# Patient Record
Sex: FEMALE | Race: WHITE | NOT HISPANIC OR LATINO | Employment: STUDENT | URBAN - NONMETROPOLITAN AREA
[De-identification: names, ages, dates, MRNs, and addresses within clinical notes are randomized per-mention and may not be internally consistent; named-entity substitution may affect disease eponyms.]

---

## 2017-01-17 ENCOUNTER — OFFICE VISIT (OUTPATIENT)
Dept: URGENT CARE | Facility: PHYSICIAN GROUP | Age: 37
End: 2017-01-17
Payer: MEDICAID

## 2017-01-17 ENCOUNTER — APPOINTMENT (OUTPATIENT)
Dept: RADIOLOGY | Facility: IMAGING CENTER | Age: 37
End: 2017-01-17
Attending: FAMILY MEDICINE
Payer: MEDICAID

## 2017-01-17 VITALS
RESPIRATION RATE: 16 BRPM | BODY MASS INDEX: 36.7 KG/M2 | WEIGHT: 215 LBS | HEIGHT: 64 IN | DIASTOLIC BLOOD PRESSURE: 84 MMHG | TEMPERATURE: 98.1 F | HEART RATE: 88 BPM | OXYGEN SATURATION: 99 % | SYSTOLIC BLOOD PRESSURE: 128 MMHG

## 2017-01-17 DIAGNOSIS — M25.511 ACUTE PAIN OF RIGHT SHOULDER: ICD-10-CM

## 2017-01-17 PROCEDURE — 99214 OFFICE O/P EST MOD 30 MIN: CPT | Performed by: FAMILY MEDICINE

## 2017-01-17 PROCEDURE — 73030 X-RAY EXAM OF SHOULDER: CPT | Mod: RT | Performed by: FAMILY MEDICINE

## 2017-01-17 RX ORDER — ACETAMINOPHEN 325 MG/1
650 TABLET ORAL EVERY 4 HOURS PRN
COMMUNITY
End: 2017-08-02

## 2017-01-17 RX ORDER — PREDNISONE 20 MG/1
TABLET ORAL
Qty: 12 TAB | Refills: 0 | Status: SHIPPED | OUTPATIENT
Start: 2017-01-17 | End: 2017-08-02

## 2017-01-17 NOTE — PROGRESS NOTES
Chief Complaint:    Chief Complaint   Patient presents with   • Shoulder Pain     R shoulder that makes arm go numb X 1 month       History of Present Illness:     present. This is a new problem. Has pain in right shoulder, all over, x 1 month. Just woke up one day with the pain. Thought probably had just slept wrong on it. No injury or trauma. Since then, she will also experience fluctuating numbness in right arm. Sometimes drops objects, possibly due to numbness, not actual weakness. Takes Excedrin migraine for HAs and sometimes helps shoulder.      Review of Systems:    Constitutional: Negative for fever, chills, and diaphoresis.   Eyes: Negative for change in vision, photophobia, pain, redness, and discharge.  ENT: Negative for ear pain, ear discharge, hearing loss, tinnitus, nasal congestion, nosebleeds, and sore throat.    Respiratory: Negative for cough, hemoptysis, sputum production, shortness of breath, wheezing, and stridor.    Cardiovascular: Negative for chest pain, palpitations, orthopnea, claudication, leg swelling, and PND.   Gastrointestinal: Negative for abdominal pain, nausea, vomiting, diarrhea, constipation, blood in stool, and melena.   Genitourinary: Negative for dysuria, urinary urgency, urinary frequency, hematuria, and flank pain.   Musculoskeletal: See HPI.  Skin: Negative for rash and itching.   Neurological: See HPI.  Endo: Negative for polydipsia.   Heme: Does not bruise/bleed easily.   Psychiatric/Behavioral: Negative for depression, suicidal ideas, hallucinations, memory loss and substance abuse. The patient is not nervous/anxious and does not have insomnia.      Past Medical History:    History reviewed. No pertinent past medical history.    Past Surgical History:    History reviewed. No pertinent past surgical history.    Social History:    Social History     Social History   • Marital Status: Single     Spouse Name: N/A   • Number of Children: N/A   • Years of Education: N/A  "    Occupational History   • Not on file.     Social History Main Topics   • Smoking status: Not on file   • Smokeless tobacco: Not on file   • Alcohol Use: Not on file   • Drug Use: Not on file   • Sexual Activity: Not on file     Other Topics Concern   • Not on file     Social History Narrative       Family History:    History reviewed. No pertinent family history.    Medications:    Current Outpatient Prescriptions on File Prior to Visit   Medication Sig Dispense Refill   • fluoxetine (PROZAC) 40 MG capsule Take 40 mg by mouth every day.       No current facility-administered medications on file prior to visit.       Allergies:    Allergies   Allergen Reactions   • Pcn [Penicillins]          Vitals:    Filed Vitals:    01/17/17 1229   BP: 128/84   Pulse: 88   Temp: 36.7 °C (98.1 °F)   Resp: 16   Height: 1.626 m (5' 4\")   Weight: 97.523 kg (215 lb)   SpO2: 99%       Physical Exam:    Constitutional: Vital signs reviewed. Appears well-developed and well-nourished. No acute distress.   Eyes: Sclera white, conjunctivae clear.  ENT: External ears normal. Hearing normal.  Cardiovascular: Peripheral pulses 2+.   Pulmonary/Chest: Respirations non-labored.  Musculoskeletal: Mildly decreased right shoulder internal rotation, otherwise essentially normal right shoulder ROM. Normal gait. No tenderness to palpation. No muscular atrophy or weakness.  Neurological: Alert and oriented to person, place, and time. Muscle tone normal. Coordination normal. Light touch and sensation normal.   Skin: No rashes or lesions. Warm, dry, normal turgor.  Psychiatric: Normal mood and affect. Behavior is normal. Judgment and thought content normal.     Diagnostics:    DX-SHOULDER 2+ (Order #[id number]) on 1/17/17       Narrative        1/17/2017 12:46 PM    HISTORY/REASON FOR EXAM:  Right shoulder pain for one month      TECHNIQUE/EXAM DESCRIPTION AND NUMBER OF VIEWS:  3 views of the RIGHT shoulder.    COMPARISON: None    FINDINGS:  Bone " mineralization is normal.  There is no evidence of acute fracture.  There is no evidence of dislocation.  There is joint surface irregularity of the distal surface of the right clavicle. Marginal spurring is also present.  No abnormalities are identified in the soft tissues.         Impression        1.  There is no evidence of acute fracture.    2.  Irregularity of distal surface of right clavicle is noted. This could be due to normal variation. Differential diagnosis includes inflammatory arthropathy versus degenerative change.     Images and Rad report reviewed with them and copies to them.      Assessment / Plan:    1. Acute pain of right shoulder  - DX-SHOULDER 2+ RIGHT; Future  - predniSONE (DELTASONE) 20 MG Tab; 2 TABS ONCE A DAY ON DAYS 1-4, 1 TAB ONCE A DAY ON DAYS 5-8. TAKE WITH FOOD.  Dispense: 12 Tab; Refill: 0      Discussed with them DDX and management options.    Agreeable to medication prescribed for anti-inflammatory effect.    Follow-up with PCP or urgent care if getting worse or not better with above.

## 2017-01-17 NOTE — MR AVS SNAPSHOT
"        Aliza Garrett   2017 11:50 AM   Office Visit   MRN: [id number]    Department:  [facility]   Dept Phone:  [phone]    Description:  Female : 1980   Provider:  Zan Clark M.D.           Reason for Visit     Shoulder Pain R shoulder that makes arm go numb X 1 month      Allergies as of 2017     Allergen Noted Reactions    Pcn [Penicillins] 2014         You were diagnosed with     Acute pain of right shoulder   [[id number]]         Vital Signs     Blood Pressure Pulse Temperature Respirations Height Weight    128/84 mmHg 88 36.7 °C (98.1 °F) 16 1.626 m (5' 4\") 97.523 kg (215 lb)    Body Mass Index Oxygen Saturation Breastfeeding?             36.89 kg/m2 99% No         Basic Information     Date Of Birth Sex Race Ethnicity Preferred Language    1980 Female White Unknown English      Health Maintenance        Date Due Completion Dates    IMM DTaP/Tdap/Td Vaccine (1 - Tdap) 1998 ---    PAP SMEAR 2000 ---    IMM INFLUENZA (1) 2012            Current Immunizations     Influenza TIV (IM) 2012      Below and/or attached are the medications your provider expects you to take. Review all of your home medications and newly ordered medications with your provider and/or pharmacist. Follow medication instructions as directed by your provider and/or pharmacist. Please keep your medication list with you and share with your provider. Update the information when medications are discontinued, doses are changed, or new medications (including over-the-counter products) are added; and carry medication information at all times in the event of emergency situations     Allergies:  PCN - (reactions not documented)               Medications  Valid as of: 2017 -  2:12 PM    Generic Name Brand Name Tablet Size Instructions for use    Acetaminophen (Tab) TYLENOL 325 MG Take 650 mg by mouth every four hours as needed.        FLUoxetine HCl (Cap) PROZAC " 40 MG Take 40 mg by mouth every day.        PredniSONE (Tab) DELTASONE 20 MG 2 TABS ONCE A DAY ON DAYS 1-4, 1 TAB ONCE A DAY ON DAYS 5-8. TAKE WITH FOOD.        .                 Medicines prescribed today were sent to:     [facility] [place] - [address]    [address] [place] [address]    Phone: [phone] Fax: [phone]    Open 24 Hours?: No      Medication refill instructions:       If your prescription bottle indicates you have medication refills left, it is not necessary to call your provider’s office. Please contact your pharmacy and they will refill your medication.    If your prescription bottle indicates you do not have any refills left, you may request refills at any time through one of the following ways: The online [employer] system (except Urgent Care), by calling your provider’s office, or by asking your pharmacy to contact your provider’s office with a refill request. Medication refills are processed only during regular business hours and may not be available until the next business day. Your provider may request additional information or to have a follow-up visit with you prior to refilling your medication.   *Please Note: Medication refills are assigned a new Rx number when refilled electronically. Your pharmacy may indicate that no refills were authorized even though a new prescription for the same medication is available at the pharmacy. Please request the medicine by name with the pharmacy before contacting your provider for a refill.        Your To Do List     Future Labs/Procedures Complete By Expires    DX-SHOULDER 2+ RIGHT  As directed 1/24/2017         [employer] Access Code: [email or web address]  Expires: 2/16/2017  2:12 PM    Your email address is not on file at [facility].  Email Addresses are required for you to sign up for [employer], please contact [phone] to verify your personal information and to provide your email address prior to attempting to register for  [email or web address].    Aliza Kala Gerry  [address] [place] [address]    [employer]  A secure, online tool to manage your health information     [employer]’s Dlyte.comt® is a secure, online tool that connects you to your personalized health information from the privacy of your home -- day or night - making it very easy for you to manage your healthcare. Once the activation process is completed, you can even access your medical information using the [employer] cooper, which is available for free in the Apple Cooper store or Google Play store.     To learn more about [employer], visit [email or web address]    There are two levels of access available (as shown below):   My Chart Features  [facility] Primary Care Doctor [facility]  Specialists [facility]  Urgent  Care Non-[facility] Primary Care Doctor   Email your healthcare team securely and privately 24/7 X X X    Manage appointments: schedule your next appointment; view details of past/upcoming appointments X      Request prescription refills. X      View recent personal medical records, including lab and immunizations X X X X   View health record, including health history, allergies, medications X X X X   Read reports about your outpatient visits, procedures, consult and ER notes X X X X   See your discharge summary, which is a recap of your hospital and/or ER visit that includes your diagnosis, lab results, and care plan X X  X     How to register for [employer]:  Once your e-mail address has been verified, follow the following steps to sign up for [employer].     1. Go to  [email or web address]  2. Click on the Sign Up Now box, which takes you to the New Member Sign Up page. You will need to provide the following information:  a. Enter your [employer] Access Code exactly as it appears at the top of this page. (You will not need to use this code after you’ve completed the sign-up process. If you do not sign up before the expiration date, you must request a new code.)   b. Enter your date of  birth.   c. Enter your home email address.   d. Click Submit, and follow the next screen’s instructions.  3. Create a [employer] ID. This will be your [employer] login ID and cannot be changed, so think of one that is secure and easy to remember.  4. Create a Luminary [employer]t password. You can change your password at any time.  5. Enter your Password Reset Question and Answer. This can be used at a later time if you forget your password.   6. Enter your e-mail address. This allows you to receive e-mail notifications when new information is available in [employer] E[employer] Project.  7. Click Sign Up. You can now view your health information.    For assistance activating your [employer] account, call [phone]

## 2017-03-10 ENCOUNTER — NON-PROVIDER VISIT (OUTPATIENT)
Dept: URGENT CARE | Facility: PHYSICIAN GROUP | Age: 37
End: 2017-03-10

## 2017-03-10 DIAGNOSIS — Z02.1 PRE-EMPLOYMENT DRUG SCREENING: ICD-10-CM

## 2017-03-10 DIAGNOSIS — Z02.1 PRE-EMPLOYMENT DRUG TESTING: ICD-10-CM

## 2017-03-10 LAB
AMP AMPHETAMINE: NORMAL
COC COCAINE: NORMAL
INT CON NEG: NEGATIVE
INT CON POS: POSITIVE
MET METHAMPHETAMINES: NORMAL
OPI OPIATES: NORMAL
PCP PHENCYCLIDINE: NORMAL
POC DRUG COMMENT 753798-OCCUPATIONAL HEALTH: NORMAL
THC: NORMAL

## 2017-03-10 PROCEDURE — 80305 DRUG TEST PRSMV DIR OPT OBS: CPT | Performed by: FAMILY MEDICINE

## 2017-08-02 ENCOUNTER — OFFICE VISIT (OUTPATIENT)
Dept: URGENT CARE | Facility: PHYSICIAN GROUP | Age: 37
End: 2017-08-02
Payer: MEDICAID

## 2017-08-02 VITALS
DIASTOLIC BLOOD PRESSURE: 90 MMHG | RESPIRATION RATE: 14 BRPM | SYSTOLIC BLOOD PRESSURE: 120 MMHG | OXYGEN SATURATION: 99 % | BODY MASS INDEX: 38.93 KG/M2 | HEIGHT: 64 IN | TEMPERATURE: 98 F | WEIGHT: 228 LBS | HEART RATE: 68 BPM

## 2017-08-02 DIAGNOSIS — D22.5 NEVUS OF BACK: ICD-10-CM

## 2017-08-02 DIAGNOSIS — L98.9 PAINFUL SKIN LESION: ICD-10-CM

## 2017-08-02 PROCEDURE — 99213 OFFICE O/P EST LOW 20 MIN: CPT | Performed by: NURSE PRACTITIONER

## 2017-08-02 RX ORDER — IBUPROFEN 600 MG/1
600 TABLET ORAL EVERY 8 HOURS PRN
Qty: 30 TAB | Refills: 0 | Status: SHIPPED | OUTPATIENT
Start: 2017-08-02

## 2017-08-02 NOTE — PROGRESS NOTES
"Chief Complaint   Patient presents with   • Nevus     on back       HISTORY OF PRESENT ILLNESS: Patient is a 37 y.o. female who presents today due to concerns of a mole to her back. States she has had a mole to her back for years but is concerned as it has increased in size and has become painful over the past month. Also notes a \"black dot\" has appeared in the center of the mole in the last month. Denies drainage, fever, chills, malaise. Has not done anything or taken anything for symptom relief. She does not yet have a PCP.     There are no active problems to display for this patient.      Allergies:Pcn    No current Epic-ordered outpatient prescriptions on file.     No current Epic-ordered facility-administered medications on file.       History reviewed. No pertinent past medical history.    Social History   Substance Use Topics   • Smoking status: Never Smoker    • Smokeless tobacco: Former User     Types: Chew   • Alcohol Use: No       No family status information on file.   History reviewed. No pertinent family history.    ROS:  Review of Systems   Constitutional: Negative for fever, chills, weight loss, malaise, and fatigue.   HENT: Negative for ear pain, nosebleeds, congestion, sore throat and neck pain.    Neuro: Negative for headache, sensory changes, weakness, seizure, LOC.   Cardiovascular: Negative for chest pain, palpitations, orthopnea and leg swelling.   Respiratory: Negative for cough, sputum production, shortness of breath and wheezing.   Gastrointestinal: Negative for abdominal pain, nausea, vomiting or diarrhea.   Genitourinary: Negative for dysuria, urgency and frequency.  Musculoskeletal: Negative for falls, neck pain, back pain, joint pain, myalgias.   Skin: Negative for rash, diaphoresis. Positive for painful mole.     Exam:  Blood pressure 120/90, pulse 68, temperature 36.7 °C (98 °F), resp. rate 14, height 1.626 m (5' 4\"), weight 103.42 kg (228 lb), SpO2 99 %.  General: well-nourished, " well-developed female in NAD  Head: normocephalic, atraumatic  Eyes: PERRLA, no conjunctival injection, acuity grossly intact, lids normal.  Ears: normal shape and symmetry, no tenderness, no discharge. External canals are without any significant edema or erythema. Tympanic membranes are without any inflammation, no effusion. Gross auditory acuity is intact.  Nose: symmetrical without tenderness, no discharge.  Mouth/Throat: reasonable hygiene, no erythema, exudates or tonsillar enlargement.  Neck: no masses, range of motion within normal limits, no tracheal deviation. No obvious thyroid enlargement.   Lymph: no cervical adenopathy. No supraclavicular adenopathy.   Neuro: alert and oriented. Cranial nerves 1-12 grossly intact. No sensory deficit.   Cardiovascular: regular rate and rhythm. No edema.  Pulmonary: no distress. Chest is symmetrical with respiration, no wheezes, crackles, or rhonchi.   Abdomen: soft, non-tender, no guarding, no hepatosplenomegaly.  Musculoskeletal: no clubbing, appropriate muscle tone, gait is stable.  Skin: warm, dry, intact, no clubbing, no cyanosis, no rashes. There is a 1cm raised, circular nevus to her left mid back with a black marking cental of lesion. No associated erythema or swelling.   Psych: appropriate mood, affect, judgement.         Assessment/Plan:  1. Nevus of back  REFERRAL TO DERMATOLOGY   2. Painful skin lesion  ibuprofen (MOTRIN) 600 MG Tab    REFERRAL TO DERMATOLOGY       Refer to derm for further eval and care. Instructed to establish care with a PCP for removal this week. Motrin for pain relief.   Supportive care, differential diagnoses, and indications for immediate follow-up discussed with patient.   Pathogenesis of diagnosis discussed including typical length and natural progression.   Instructed to return to clinic or nearest emergency department for any change in condition, further concerns, or worsening of symptoms.  Patient states understanding of the plan  of care and discharge instructions.          Please note that this dictation was created using voice recognition software. I have made every reasonable attempt to correct obvious errors, but I expect that there are errors of grammar and possibly content that I did not discover before finalizing the note.      CARLITOS Frey.

## 2017-08-02 NOTE — MR AVS SNAPSHOT
"        Aliza Garrett   2017 11:00 AM   Office Visit   MRN: [id number]    Department:  [facility]   Dept Phone:  [phone]    Description:  Female : 1980   Provider:  ERA Garnett           Reason for Visit     Nevus on back      Allergies as of 2017     Allergen Noted Reactions    Pcn [Penicillins] 2014   Hives    Child jay      You were diagnosed with     Nevus of back   [[id number]]       Painful skin lesion   [[id number]]         Vital Signs     Blood Pressure Pulse Temperature Respirations Height Weight    120/90 mmHg 68 36.7 °C (98 °F) 14 1.626 m (5' 4\") 103.42 kg (228 lb)    Body Mass Index Oxygen Saturation Smoking Status             39.12 kg/m2 99% Never Smoker          Basic Information     Date Of Birth Sex Race Ethnicity Preferred Language    1980 Female White Unknown English      Health Maintenance        Date Due Completion Dates    IMM DTaP/Tdap/Td Vaccine (1 - Tdap) 1998 ---    PAP SMEAR 2000 ---    IMM INFLUENZA (1) 2012            Current Immunizations     Influenza TIV (IM) 2012      Below and/or attached are the medications your provider expects you to take. Review all of your home medications and newly ordered medications with your provider and/or pharmacist. Follow medication instructions as directed by your provider and/or pharmacist. Please keep your medication list with you and share with your provider. Update the information when medications are discontinued, doses are changed, or new medications (including over-the-counter products) are added; and carry medication information at all times in the event of emergency situations     Allergies:  PCN - Hives               Medications  Valid as of: 2017 -  2:23 PM    Generic Name Brand Name Tablet Size Instructions for use    Ibuprofen (Tab) MOTRIN 600 MG Take 1 Tab by mouth every 8 hours as needed for Mild Pain, Moderate Pain or Inflammation. Take with " food.        .                 Medicines prescribed today were sent to:     [facility] - [place], [place] - [address]    [address] [place] [address]    Phone: [phone] Fax: [phone]    Open 24 Hours?: No      Medication refill instructions:       If your prescription bottle indicates you have medication refills left, it is not necessary to call your provider’s office. Please contact your pharmacy and they will refill your medication.    If your prescription bottle indicates you do not have any refills left, you may request refills at any time through one of the following ways: The online [employer] system (except Urgent Care), by calling your provider’s office, or by asking your pharmacy to contact your provider’s office with a refill request. Medication refills are processed only during regular business hours and may not be available until the next business day. Your provider may request additional information or to have a follow-up visit with you prior to refilling your medication.   *Please Note: Medication refills are assigned a new Rx number when refilled electronically. Your pharmacy may indicate that no refills were authorized even though a new prescription for the same medication is available at the pharmacy. Please request the medicine by name with the pharmacy before contacting your provider for a refill.        Referral     A referral request has been sent to our patient care coordination department. Please allow 3-5 business days for us to process this request and contact you either by phone or mail. If you do not hear from us by the 5th business day, please call us at [phone].           [employer] Access Code: [email or web address]  Expires: 9/1/2017  2:23 PM    Your email address is not on file at [facility].  Email Addresses are required for you to sign up for [employer], please contact [phone] to verify your personal information and to provide your email address prior to attempting  to register for [employer].    Aliza Garrett  [address] [place] [address], [place] [address]    MyChart  A secure, online tool to manage your health information     [employer]’s M[employer]na [employer]t® is a secure, online tool that connects you to your personalized health information from the privacy of your home -- day or night - making it very easy for you to manage your healthcare. Once the activation process is completed, you can even access your medical information using the [employer] cooper, which is available for free in the Apple Cooper store or Google Play store.     To learn more about [employer], visit [email or web address]    There are two levels of access available (as shown below):   My Chart Features  [facility] Primary Care Doctor [facility]  Specialists [facility]  Urgent  Care Non-[facility] Primary Care Doctor   Email your healthcare team securely and privately 24/7 X X X    Manage appointments: schedule your next appointment; view details of past/upcoming appointments X      Request prescription refills. X      View recent personal medical records, including lab and immunizations X X X X   View health record, including health history, allergies, medications X X X X   Read reports about your outpatient visits, procedures, consult and ER notes X X X X   See your discharge summary, which is a recap of your hospital and/or ER visit that includes your diagnosis, lab results, and care plan X X  X     How to register for [employer]:  Once your e-mail address has been verified, follow the following steps to sign up for [employer].     1. Go to  [email or web address]  2. Click on the Sign Up Now box, which takes you to the New Member Sign Up page. You will need to provide the following information:  a. Enter your [employer] Access Code exactly as it appears at the top of this page. (You will not need to use this code after you’ve completed the sign-up process. If you do not sign up before the expiration date, you must request a new code.)   b. Enter  your date of birth.   c. Enter your home email address.   d. Click Submit, and follow the next screen’s instructions.  3. Create a Thrillophilia.com ID. This will be your [employer]com login ID and cannot be changed, so think of one that is secure and easy to remember.  4. Create a V[employer] password. You can change your password at any time.  5. Enter your Password Reset Question and Answer. This can be used at a later time if you forget your password.   6. Enter your e-mail address. This allows you to receive e-mail notifications when new information is available in [email or web address].  7. Click Sign Up. You can now view your health information.    For assistance activating your [email or web address] account, call [phone]

## 2017-11-02 ENCOUNTER — APPOINTMENT (RX ONLY)
Dept: URBAN - METROPOLITAN AREA CLINIC 31 | Facility: CLINIC | Age: 37
Setting detail: DERMATOLOGY
End: 2017-11-02

## 2017-11-02 DIAGNOSIS — D22 MELANOCYTIC NEVI: ICD-10-CM

## 2017-11-02 PROBLEM — L85.3 XEROSIS CUTIS: Status: ACTIVE | Noted: 2017-11-02

## 2017-11-02 PROBLEM — D22.9 MELANOCYTIC NEVI, UNSPECIFIED: Status: ACTIVE | Noted: 2017-11-02

## 2017-11-02 PROBLEM — L29.8 OTHER PRURITUS: Status: ACTIVE | Noted: 2017-11-02

## 2017-11-02 PROCEDURE — 99202 OFFICE O/P NEW SF 15 MIN: CPT

## 2017-11-02 PROCEDURE — ? COUNSELING

## 2017-11-02 NOTE — PROCEDURE: COUNSELING
Detail Level: Detailed
Patient Specific Counseling (Will Not Stick From Patient To Patient): Discussed biopsy what we are looking to rule out and further treatment

## 2017-12-12 ENCOUNTER — APPOINTMENT (RX ONLY)
Dept: URBAN - METROPOLITAN AREA CLINIC 31 | Facility: CLINIC | Age: 37
Setting detail: DERMATOLOGY
End: 2017-12-12

## 2017-12-12 DIAGNOSIS — D22 MELANOCYTIC NEVI: ICD-10-CM

## 2017-12-12 PROBLEM — D22.5 MELANOCYTIC NEVI OF TRUNK: Status: ACTIVE | Noted: 2017-12-12

## 2017-12-12 PROCEDURE — 11100: CPT

## 2017-12-12 PROCEDURE — ? BIOPSY BY SHAVE METHOD

## 2017-12-12 ASSESSMENT — LOCATION DETAILED DESCRIPTION DERM: LOCATION DETAILED: LEFT INFERIOR UPPER BACK

## 2017-12-12 ASSESSMENT — LOCATION SIMPLE DESCRIPTION DERM: LOCATION SIMPLE: LEFT UPPER BACK

## 2017-12-12 ASSESSMENT — LOCATION ZONE DERM: LOCATION ZONE: TRUNK

## 2017-12-12 NOTE — PROCEDURE: BIOPSY BY SHAVE METHOD
Bill 87695 For Specimen Handling/Conveyance To Laboratory?: no
Consent: Written consent was obtained and risks were reviewed including but not limited to scarring, infection, bleeding, scabbing, incomplete removal, nerve damage and allergy to anesthesia.
Render Post-Care Instructions In Note?: yes
Detail Level: Detailed
Dressing: bandage
Additional Anesthesia Volume In Cc (Will Not Render If 0): 0
Wound Care: Aquaphor
Lab Facility: [id number]
Accession #: [id number]
Silver Nitrate Text: The wound bed was treated with silver nitrate after the biopsy was performed.
Billing Type: Third-Party Bill
Anesthesia Volume In Cc: 0.5
Electrodesiccation And Curettage Text: The wound bed was treated with electrodesiccation and curettage after the biopsy was performed.
Cryotherapy Text: The wound bed was treated with cryotherapy after the biopsy was performed.
Biopsy Type: H and E
Biopsy Method: 15 blade
Post-Care Instructions: I reviewed with the patient in detail post-care instructions. Patient is to keep the biopsy site dry overnight, and then apply bacitracin twice daily until healed. Patient may apply hydrogen peroxide soaks to remove any crusting.
Hemostasis: Electrocautery
Type Of Destruction Used: Silver Nitrate
Notification Instructions: Patient will be notified of biopsy results. However, patient instructed to call the office if not contacted within 2 weeks.
Lab: [id number]
Anesthesia Type: 1% lidocaine with epinephrine

## 2018-01-15 ENCOUNTER — OFFICE VISIT (OUTPATIENT)
Dept: URGENT CARE | Facility: PHYSICIAN GROUP | Age: 38
End: 2018-01-15

## 2018-01-15 DIAGNOSIS — Z02.1 PRE-EMPLOYMENT DRUG SCREENING: ICD-10-CM

## 2018-01-15 LAB
AMP AMPHETAMINE: NORMAL
COC COCAINE: NORMAL
INT CON NEG: NORMAL
INT CON POS: NORMAL
MET METHAMPHETAMINES: NORMAL
OPI OPIATES: NORMAL
PCP PHENCYCLIDINE: NORMAL
POC DRUG COMMENT 753798-OCCUPATIONAL HEALTH: NEGATIVE
THC: NORMAL

## 2018-01-15 PROCEDURE — 80305 DRUG TEST PRSMV DIR OPT OBS: CPT | Performed by: PHYSICIAN ASSISTANT

## 2018-05-09 ENCOUNTER — OFFICE VISIT (OUTPATIENT)
Dept: URGENT CARE | Facility: PHYSICIAN GROUP | Age: 38
End: 2018-05-09
Payer: MEDICAID

## 2018-05-09 VITALS
TEMPERATURE: 97.1 F | HEART RATE: 80 BPM | OXYGEN SATURATION: 99 % | RESPIRATION RATE: 16 BRPM | DIASTOLIC BLOOD PRESSURE: 70 MMHG | WEIGHT: 225 LBS | SYSTOLIC BLOOD PRESSURE: 110 MMHG | BODY MASS INDEX: 38.41 KG/M2 | HEIGHT: 64 IN

## 2018-05-09 DIAGNOSIS — R31.21 ASYMPTOMATIC MICROSCOPIC HEMATURIA: ICD-10-CM

## 2018-05-09 DIAGNOSIS — M54.9 BACK PAIN, UNSPECIFIED BACK LOCATION, UNSPECIFIED BACK PAIN LATERALITY, UNSPECIFIED CHRONICITY: ICD-10-CM

## 2018-05-09 LAB
APPEARANCE UR: CLEAR
BILIRUB UR STRIP-MCNC: NEGATIVE MG/DL
COLOR UR AUTO: YELLOW
GLUCOSE UR STRIP.AUTO-MCNC: NEGATIVE MG/DL
KETONES UR STRIP.AUTO-MCNC: NEGATIVE MG/DL
LEUKOCYTE ESTERASE UR QL STRIP.AUTO: NORMAL
NITRITE UR QL STRIP.AUTO: NEGATIVE
PH UR STRIP.AUTO: 5 [PH] (ref 5–8)
PROT UR QL STRIP: NORMAL MG/DL
RBC UR QL AUTO: NORMAL
SP GR UR STRIP.AUTO: 1.03
UROBILINOGEN UR STRIP-MCNC: NEGATIVE MG/DL

## 2018-05-09 PROCEDURE — 81002 URINALYSIS NONAUTO W/O SCOPE: CPT | Performed by: NURSE PRACTITIONER

## 2018-05-09 PROCEDURE — 99213 OFFICE O/P EST LOW 20 MIN: CPT | Mod: 25 | Performed by: NURSE PRACTITIONER

## 2018-05-09 ASSESSMENT — ENCOUNTER SYMPTOMS
RESPIRATORY NEGATIVE: 1
CONSTITUTIONAL NEGATIVE: 1
GASTROINTESTINAL NEGATIVE: 1
MUSCULOSKELETAL NEGATIVE: 1
NEUROLOGICAL NEGATIVE: 1
CARDIOVASCULAR NEGATIVE: 1

## 2018-05-09 NOTE — LETTER
May 9, 2018    To Whom It May Concern:         This is confirmation that Aliza Danielleee Gerry attended her scheduled appointment with ERA Davis on 5/09/18. Patient may return to work tomorrow May 10, 2018.         If you have any questions please do not hesitate to call me at the phone number listed below.    Sincerely,          CARLITOS Davis.  [phone]

## 2018-05-10 NOTE — PROGRESS NOTES
"Subjective:      Aliza Garrett is a 37 y.o. female who presents with Fall (Back Pain)            HPI   Pt states that last night when she was getting out of bed to go to the bathroom she missed a step and tweaked her back, mild discomfort last night that has resolved  Patient is denying any falls.  Patient is denying hitting anything  Patient states that this are steep  Patient is denying any type of neck pain or flank pain  Patient is denying any numbness tingling saddle anesthesia or bowel incontinence  Patient is denying any hematuria or vaginal bleeding--> incidental blood found on urinalysis done by MA  Patient states she's had a hysterectomy  Patient states she's had a history of kidney stones but states she never knew she was passing a stone  Patient states she just needs a note for work tomorrow    PMH:  has no past medical history on file.  MEDS:   Current Outpatient Prescriptions:   •  ibuprofen (MOTRIN) 600 MG Tab, Take 1 Tab by mouth every 8 hours as needed for Mild Pain, Moderate Pain or Inflammation. Take with food., Disp: 30 Tab, Rfl: 0  ALLERGIES:   Allergies   Allergen Reactions   • Pcn [Penicillins] Hives     Child jay     SURGHX:   Past Surgical History:   Procedure Laterality Date   • ABDOMINAL HYSTERECTOMY TOTAL       SOCHX:  reports that she has never smoked. She has quit using smokeless tobacco. Her smokeless tobacco use included Chew. She reports that she does not drink alcohol or use drugs.  FH: family history is not on file.    Review of Systems   Constitutional: Negative.    Respiratory: Negative.    Cardiovascular: Negative.    Gastrointestinal: Negative.    Genitourinary: Negative.    Musculoskeletal: Negative.    Skin: Negative.    Neurological: Negative.    Endo/Heme/Allergies: Negative.      Steady gait     Objective:     /70   Pulse 80   Temp 36.2 °C (97.1 °F)   Resp 16   Ht 1.626 m (5' 4\")   Wt 102.1 kg (225 lb)   SpO2 99%   BMI 38.62 kg/m²      Physical Exam "   Constitutional: She is oriented to person, place, and time. She appears well-developed and well-nourished.   HENT:   Head: Normocephalic and atraumatic.   Eyes: Conjunctivae are normal.   Neck: Normal range of motion. Neck supple.   Cardiovascular: Normal rate, regular rhythm and normal heart sounds.    Pulmonary/Chest: Effort normal and breath sounds normal.   Abdominal: There is no CVA tenderness.   Musculoskeletal: Normal range of motion.   Normal range of motion to lumbar spine.  Steady gait  2+ pedal pulses  No spinal or paraspinal tenderness  No ecchymosis noted or buttocks     Neurological: She is alert and oriented to person, place, and time.   Skin: Skin is warm and dry. Capillary refill takes less than 2 seconds.   Psychiatric: She has a normal mood and affect. Her behavior is normal. Judgment and thought content normal.               Assessment/Plan:     1. Back pain, unspecified back location, unspecified back pain laterality, unspecified chronicity    - POCT Urinalysis--> large blood  -Patient currently has no back pain  -Work note given as requested  -Patient to monitor for back pain or discomfort and follow up  -Severe back pain with saddle anesthesia bowel incontinence numbness or weakness patient go to ER    2. Asymptomatic microscopic hematuria  -Patient is asymptomatic and incidental finding  -Will send for culture and if positive will treat for UTI  -If culture is negative for bacteria patient will need to follow up with PCP retest her urine or hematuria and may need further workup  -Discussed with patient in detail  -Patient to monitor for any abnormal bleeding and follow-up sooner

## 2023-09-19 ENCOUNTER — HOSPITAL ENCOUNTER (OUTPATIENT)
Dept: LAB | Facility: MEDICAL CENTER | Age: 43
End: 2023-09-19
Attending: FAMILY MEDICINE
Payer: MEDICAID

## 2023-09-19 ENCOUNTER — OFFICE VISIT (OUTPATIENT)
Dept: URGENT CARE | Facility: PHYSICIAN GROUP | Age: 43
End: 2023-09-19
Payer: MEDICAID

## 2023-09-19 VITALS
BODY MASS INDEX: 43.87 KG/M2 | TEMPERATURE: 97.5 F | WEIGHT: 273 LBS | RESPIRATION RATE: 16 BRPM | HEART RATE: 78 BPM | SYSTOLIC BLOOD PRESSURE: 126 MMHG | HEIGHT: 66 IN | OXYGEN SATURATION: 98 % | DIASTOLIC BLOOD PRESSURE: 86 MMHG

## 2023-09-19 DIAGNOSIS — R50.9 FEVER, UNKNOWN ORIGIN: ICD-10-CM

## 2023-09-19 LAB
BASOPHILS # BLD AUTO: 0.3 % (ref 0–1.8)
BASOPHILS # BLD: 0.03 K/UL (ref 0–0.12)
EOSINOPHIL # BLD AUTO: 0.05 K/UL (ref 0–0.51)
EOSINOPHIL NFR BLD: 0.5 % (ref 0–6.9)
ERYTHROCYTE [DISTWIDTH] IN BLOOD BY AUTOMATED COUNT: 44.4 FL (ref 35.9–50)
FLUAV RNA SPEC QL NAA+PROBE: NEGATIVE
FLUBV RNA SPEC QL NAA+PROBE: NEGATIVE
HCT VFR BLD AUTO: 44.9 % (ref 37–47)
HGB BLD-MCNC: 14.8 G/DL (ref 12–16)
IMM GRANULOCYTES # BLD AUTO: 0.09 K/UL (ref 0–0.11)
IMM GRANULOCYTES NFR BLD AUTO: 0.9 % (ref 0–0.9)
LYMPHOCYTES # BLD AUTO: 2.59 K/UL (ref 1–4.8)
LYMPHOCYTES NFR BLD: 26.2 % (ref 22–41)
MCH RBC QN AUTO: 30.5 PG (ref 27–33)
MCHC RBC AUTO-ENTMCNC: 33 G/DL (ref 32.2–35.5)
MCV RBC AUTO: 92.6 FL (ref 81.4–97.8)
MONOCYTES # BLD AUTO: 0.55 K/UL (ref 0–0.85)
MONOCYTES NFR BLD AUTO: 5.6 % (ref 0–13.4)
NEUTROPHILS # BLD AUTO: 6.57 K/UL (ref 1.82–7.42)
NEUTROPHILS NFR BLD: 66.5 % (ref 44–72)
NRBC # BLD AUTO: 0 K/UL
NRBC BLD-RTO: 0 /100 WBC (ref 0–0.2)
PLATELET # BLD AUTO: 356 K/UL (ref 164–446)
PMV BLD AUTO: 10.6 FL (ref 9–12.9)
RBC # BLD AUTO: 4.85 M/UL (ref 4.2–5.4)
RSV RNA SPEC QL NAA+PROBE: NEGATIVE
SARS-COV-2 RNA RESP QL NAA+PROBE: NEGATIVE
WBC # BLD AUTO: 9.9 K/UL (ref 4.8–10.8)

## 2023-09-19 PROCEDURE — 87637 SARSCOV2&INF A&B&RSV AMP PRB: CPT | Mod: QW | Performed by: FAMILY MEDICINE

## 2023-09-19 PROCEDURE — 36415 COLL VENOUS BLD VENIPUNCTURE: CPT

## 2023-09-19 PROCEDURE — 3079F DIAST BP 80-89 MM HG: CPT | Performed by: FAMILY MEDICINE

## 2023-09-19 PROCEDURE — 99203 OFFICE O/P NEW LOW 30 MIN: CPT | Performed by: FAMILY MEDICINE

## 2023-09-19 PROCEDURE — 85025 COMPLETE CBC W/AUTO DIFF WBC: CPT

## 2023-09-19 PROCEDURE — 3074F SYST BP LT 130 MM HG: CPT | Performed by: FAMILY MEDICINE

## 2023-09-19 RX ORDER — SODIUM CHLORIDE, SODIUM LACTATE, POTASSIUM CHLORIDE, CALCIUM CHLORIDE 600; 310; 30; 20 MG/100ML; MG/100ML; MG/100ML; MG/100ML
INJECTION, SOLUTION INTRAVENOUS
COMMUNITY
Start: 2023-06-23

## 2023-09-19 NOTE — LETTER
September 19, 2023         Patient: Aliza Garrett   YOB: 1980   Date of Visit: 9/19/2023           To Whom it May Concern:    Aliza Garrett was seen in my clinic on 9/19/2023. She may return to work on Friday.   Please excuse her recent work absence.    If you have any questions or concerns, please don't hesitate to call.        Sincerely,           Gage Maria M.D.  Electronically Signed

## 2023-09-19 NOTE — RESULT ENCOUNTER NOTE
Please call pt:      COVID, influenza screen negative.       Follow up in one week if no improvement, sooner if symptoms worsen.

## 2023-09-19 NOTE — PROGRESS NOTES
"Chief Complaint   Patient presents with    Fever     Since last Thursday   No other sx. 101 temp    Headache     Yesterday pos caused by fever.          fever  This is a new problem. The current episode started last night ago. The problem has been waxing and waning.  Maximum temperature was 101.    Associated symptoms include fatigue, headache.      She denies dysuria, cough, sore throat or nasal congestion Pertinent negatives include no  Neck pain, nausea, vomiting, diarrhea,   or wheezing. Nothing aggravates the symptoms.  Patient has tried Tylenol for the symptoms which reduced the fever.                       Review of Systems   Constitutional: positive for fatigue  HENT: negative for ear pain   Cardiovascular - denies chest pain or dyspnea  Respiratory: .  Negative for wheezing.    Neurological: +  for headaches.   GI - denies nausea, vomiting or diarrhea  Neuro - denies numbness or tingling.            Objective:     /86   Pulse 78   Temp 36.4 °C (97.5 °F) (Temporal)   Resp 16   Ht 1.676 m (5' 6\")   Wt 124 kg (273 lb)   SpO2 98%         Physical Exam   Constitutional: patient is oriented to person, place, and time. Patient appears well-developed and well-nourished. No distress.   HENT:   Head: Normocephalic and atraumatic.   Right Ear: External ear normal.   Left Ear: External ear normal.   Nose:  No mucosal edema.  Right sinus exhibits no maxillary sinus tenderness. Left sinus exhibits no maxillary sinus tenderness.   Mouth/Throat: Mucous membranes are normal. No oral lesions. No posterior erythema. No oropharyngeal exudate or posterior oropharyngeal edema.   Eyes: Conjunctivae and EOM are normal. Pupils are equal, round, and reactive to light. Right eye exhibits no discharge. Left eye exhibits no discharge. No scleral icterus.   Neck: Normal range of motion. Neck supple. No tracheal deviation present.   Cardiovascular: Normal rate, regular rhythm and normal heart sounds.  Exam reveals no friction " rub.    Pulmonary/Chest: Effort normal. No respiratory distress. Patient has no wheezes or rhonchi. Patient has no rales.    Musculoskeletal:  exhibits no edema.   Lymphadenopathy:  none  Neurological: patient is alert and oriented to person, place, and time.   Skin: Skin is warm and dry. No rash noted. No erythema.   Psychiatric: patient  has a normal mood and affect.  behavior is normal.   Nursing note and vitals reviewed.              Assessment/Plan:     1. Fever, unknown origin    COVID , influenza negative    She has no other sx to explain the fever      Will check CBC      Differential diagnosis, natural history, supportive care, and indications for immediate follow-up discussed. All questions answered. Patient agrees with the plan of care.     Follow-up as needed if symptoms worsen or fail to improve to PCP, Urgent care or Emergency Room.     I have personally reviewed prior external notes and test results pertinent to today's visit.  I have independently reviewed and interpreted all diagnostics ordered during this urgent care acute visit.

## 2023-10-09 ENCOUNTER — OFFICE VISIT (OUTPATIENT)
Dept: URGENT CARE | Facility: PHYSICIAN GROUP | Age: 43
End: 2023-10-09
Payer: MEDICAID

## 2023-10-09 VITALS
SYSTOLIC BLOOD PRESSURE: 102 MMHG | OXYGEN SATURATION: 97 % | WEIGHT: 273 LBS | RESPIRATION RATE: 16 BRPM | DIASTOLIC BLOOD PRESSURE: 84 MMHG | HEIGHT: 66 IN | TEMPERATURE: 97.7 F | BODY MASS INDEX: 43.87 KG/M2 | HEART RATE: 76 BPM

## 2023-10-09 DIAGNOSIS — J06.9 VIRAL URI WITH COUGH: Primary | ICD-10-CM

## 2023-10-09 LAB
FLUAV RNA SPEC QL NAA+PROBE: NEGATIVE
FLUBV RNA SPEC QL NAA+PROBE: NEGATIVE
RSV RNA SPEC QL NAA+PROBE: NEGATIVE
SARS-COV-2 RNA RESP QL NAA+PROBE: NEGATIVE

## 2023-10-09 PROCEDURE — 3074F SYST BP LT 130 MM HG: CPT | Performed by: PHYSICIAN ASSISTANT

## 2023-10-09 PROCEDURE — 99213 OFFICE O/P EST LOW 20 MIN: CPT | Performed by: PHYSICIAN ASSISTANT

## 2023-10-09 PROCEDURE — 3079F DIAST BP 80-89 MM HG: CPT | Performed by: PHYSICIAN ASSISTANT

## 2023-10-09 PROCEDURE — 87637 SARSCOV2&INF A&B&RSV AMP PRB: CPT | Mod: QW | Performed by: PHYSICIAN ASSISTANT

## 2023-10-09 RX ORDER — BENZONATATE 100 MG/1
100 CAPSULE ORAL 3 TIMES DAILY PRN
Qty: 21 CAPSULE | Refills: 0 | Status: SHIPPED | OUTPATIENT
Start: 2023-10-09

## 2023-10-09 NOTE — PROGRESS NOTES
"Subjective:   Aliza Garrett is a 43 y.o. female who presents for Cough (X 2 days with fever about 2 weeks off and on, wheezing, S.O.B..  Negative Covid and strep 2 weeks ago. )      HPI  The patient presents to the Urgent Care with complaints of a cough and runny nose onset 2 days ago.  Reports of a unspecified fever 2 weeks ago and had negative viral testing and normal CBC.  Developed symptoms 2 days ago.  Intermittent fever.  101F Tmax.  Associated fatigue, nonproductive cough.  Mild shortness of breath while walking. Denies any body aches, vomiting, diarrhea. No known history of COVID. COVID vaccinated. Nonsmoker.             History reviewed. No pertinent past medical history.  Allergies   Allergen Reactions    Marijuana (Cannabis Sativa) Anaphylaxis    Pcn [Penicillins] Hives     Child jay        Objective:     /84   Pulse 76   Temp 36.5 °C (97.7 °F) (Temporal)   Resp 16   Ht 1.676 m (5' 6\")   Wt 124 kg (273 lb)   SpO2 97%   BMI 44.06 kg/m²     Physical Exam  Vitals reviewed.   Constitutional:       General: She is not in acute distress.     Appearance: Normal appearance. She is not ill-appearing or toxic-appearing.   HENT:      Mouth/Throat:      Mouth: Mucous membranes are moist.      Pharynx: Oropharynx is clear. Uvula midline. Posterior oropharyngeal erythema present. No pharyngeal swelling, oropharyngeal exudate or uvula swelling.   Eyes:      Conjunctiva/sclera: Conjunctivae normal.      Pupils: Pupils are equal, round, and reactive to light.   Cardiovascular:      Rate and Rhythm: Normal rate and regular rhythm.      Heart sounds: Normal heart sounds.   Pulmonary:      Effort: Pulmonary effort is normal. No respiratory distress.      Breath sounds: Normal breath sounds. No wheezing, rhonchi or rales.   Musculoskeletal:      Cervical back: Neck supple. No rigidity.   Lymphadenopathy:      Cervical: No cervical adenopathy.   Skin:     General: Skin is warm and dry.   Neurological:     "  General: No focal deficit present.      Mental Status: She is alert and oriented to person, place, and time.   Psychiatric:         Mood and Affect: Mood normal.         Behavior: Behavior normal.       Results for orders placed or performed in visit on 10/09/23   POCT CoV-2, Flu A/B, RSV by PCR   Result Value Ref Range    SARS-CoV-2 by PCR Negative Negative, Invalid    Influenza virus A RNA Negative Negative, Invalid    Influenza virus B, PCR Negative Negative, Invalid    RSV, PCR Negative Negative, Invalid       Diagnosis and associated orders:     1. Viral URI with cough  - benzonatate (TESSALON) 100 MG Cap; Take 1 Capsule by mouth 3 times a day as needed for Cough.  Dispense: 21 Capsule; Refill: 0  - POCT CoV-2, Flu A/B, RSV by PCR       Comments/MDM:     The patient's presenting symptoms and exam findings are consistent with a upper respiratory infection most likely viral etiology. They have a normal pulse oximetry on room air, afebrile, and a normal pulmonary exam. Overall, the patient is very well appearing. I do not feel that this patient would benefit from antibiotics at this time.   Recommended symptomatic and supportive care at this time that includes plenty of fluids, rest, Tylenol/Ibuprofen for pain/fever, warm salt water gargles for sore throat, OTC cough and decongestant medication, Flonase, nasal saline washes. If no improvement in 5-7 days or any worsening symptoms, recommend returning to the urgent care for re-evaluation.        I personally reviewed prior external notes and test results pertinent to today's visit. Pathogenesis of diagnosis discussed including typical length and natural progression. Supportive care, natural history, differential diagnoses, and indications for immediate follow-up discussed. Patient expresses understanding and agrees to plan. Patient denies any other questions or concerns.     Follow-up with the primary care physician for recheck, reevaluation, and consideration of  further management.    Please note that this dictation was created using voice recognition software. I have made a reasonable attempt to correct obvious errors, but I expect that there are errors of grammar and possibly content that I did not discover before finalizing the note.    This note was electronically signed by Nick Camara PA-C

## 2024-09-26 ENCOUNTER — OFFICE VISIT (OUTPATIENT)
Dept: URGENT CARE | Facility: CLINIC | Age: 44
End: 2024-09-26
Payer: MEDICAID

## 2024-09-26 VITALS
BODY MASS INDEX: 41.49 KG/M2 | DIASTOLIC BLOOD PRESSURE: 84 MMHG | RESPIRATION RATE: 18 BRPM | TEMPERATURE: 97.9 F | HEART RATE: 94 BPM | WEIGHT: 258.16 LBS | SYSTOLIC BLOOD PRESSURE: 122 MMHG | HEIGHT: 66 IN | OXYGEN SATURATION: 94 %

## 2024-09-26 DIAGNOSIS — J06.9 VIRAL URI: ICD-10-CM

## 2024-09-26 DIAGNOSIS — R09.81 SINUS CONGESTION: ICD-10-CM

## 2024-09-26 PROCEDURE — 3079F DIAST BP 80-89 MM HG: CPT | Performed by: PHYSICIAN ASSISTANT

## 2024-09-26 PROCEDURE — 99213 OFFICE O/P EST LOW 20 MIN: CPT | Performed by: PHYSICIAN ASSISTANT

## 2024-09-26 PROCEDURE — 3074F SYST BP LT 130 MM HG: CPT | Performed by: PHYSICIAN ASSISTANT

## 2024-09-26 RX ORDER — FLUTICASONE PROPIONATE 50 MCG
1 SPRAY, SUSPENSION (ML) NASAL 2 TIMES DAILY
Qty: 16 G | Refills: 0 | Status: SHIPPED | OUTPATIENT
Start: 2024-09-26

## 2024-09-26 ASSESSMENT — ENCOUNTER SYMPTOMS
MYALGIAS: 0
COUGH: 1
SHORTNESS OF BREATH: 0
FEVER: 1
VOMITING: 0
HEADACHES: 1
SORE THROAT: 1
DIARRHEA: 0
EYE REDNESS: 0
SINUS PAIN: 1
EYE DISCHARGE: 0
NAUSEA: 0

## 2024-09-26 NOTE — PROGRESS NOTES
Subjective     Aliza Garrett is a 44 y.o. female who presents with Cough (Congestion, Fever, cough x 3 days. )            URI   This is a new problem. Episode onset: x 3 days ago. The problem has been unchanged. The maximum temperature recorded prior to her arrival was 101 - 101.9 F. Associated symptoms include congestion, coughing, headaches, sinus pain, sneezing and a sore throat. Pertinent negatives include no chest pain, diarrhea, nausea or vomiting.     PMH:  has no past medical history on file.  MEDS:   Current Outpatient Medications:     benzonatate (TESSALON) 100 MG Cap, Take 1 Capsule by mouth 3 times a day as needed for Cough. (Patient not taking: Reported on 9/26/2024), Disp: 21 Capsule, Rfl: 0    Lactated Ringers (LR) Solution, , Disp: , Rfl:     ibuprofen (MOTRIN) 600 MG Tab, Take 1 Tab by mouth every 8 hours as needed for Mild Pain, Moderate Pain or Inflammation. Take with food. (Patient not taking: Reported on 9/26/2024), Disp: 30 Tab, Rfl: 0  ALLERGIES:   Allergies   Allergen Reactions    Marijuana (Cannabis Sativa) Anaphylaxis    Pcn [Penicillins] Hives     Child jay     SURGHX:   Past Surgical History:   Procedure Laterality Date    ABDOMINAL HYSTERECTOMY TOTAL       SOCHX:  reports that she has never smoked. She has quit using smokeless tobacco.  Her smokeless tobacco use included chew. She reports that she does not drink alcohol and does not use drugs.  FH: Family history was reviewed, no pertinent findings to report      Review of Systems   Constitutional:  Positive for fever.   HENT:  Positive for congestion, sinus pain, sneezing and sore throat.    Eyes:  Negative for discharge and redness.   Respiratory:  Positive for cough. Negative for shortness of breath.    Cardiovascular:  Negative for chest pain.   Gastrointestinal:  Negative for diarrhea, nausea and vomiting.   Musculoskeletal:  Negative for myalgias.   Neurological:  Positive for headaches.              Objective     BP  "122/84   Pulse 94   Temp 36.6 °C (97.9 °F) (Temporal)   Resp 18   Ht 1.676 m (5' 6\")   Wt 117 kg (258 lb 2.5 oz)   SpO2 94%   BMI 41.67 kg/m²      Physical Exam  Constitutional:       General: She is not in acute distress.     Appearance: Normal appearance. She is not ill-appearing.   HENT:      Head: Normocephalic and atraumatic.      Right Ear: Tympanic membrane, ear canal and external ear normal.      Left Ear: Tympanic membrane, ear canal and external ear normal.      Nose: Nose normal.      Mouth/Throat:      Mouth: Mucous membranes are moist.      Pharynx: Oropharynx is clear. No posterior oropharyngeal erythema.   Eyes:      Extraocular Movements: Extraocular movements intact.      Conjunctiva/sclera: Conjunctivae normal.   Cardiovascular:      Rate and Rhythm: Normal rate and regular rhythm.      Heart sounds: Normal heart sounds.   Pulmonary:      Effort: Pulmonary effort is normal. No respiratory distress.      Breath sounds: Normal breath sounds. No wheezing.   Musculoskeletal:         General: Normal range of motion.      Cervical back: Normal range of motion and neck supple.   Skin:     General: Skin is warm and dry.   Neurological:      Mental Status: She is alert and oriented to person, place, and time.                  Progress:  The patient declined viral testing today in clinic.            Assessment & Plan        Assessment & Plan  Viral URI         Sinus congestion    Orders:    fluticasone (FLONASE) 50 MCG/ACT nasal spray; Administer 1 Spray into affected nostril(S) 2 times a day.            The patient's presenting symptoms and physical exam findings are consistent with a viral URI with associated sinus congestion.  The patient's physical exam today in clinic was normal.  The patient is nontoxic and appears in no acute distress.  The patient's vital signs are stable and within normal limits.  She is afebrile today in clinic.  Discussed likely viral etiology with the patient.  The patient " declined viral testing at this time.  Will prescribe the patient Flonase for her current symptoms.  Advised the patient to monitor for worsening signs and or symptoms.  Recommend OTC medications and supportive care for symptomatic management.  Recommend patient follow-up with primary care as needed.  Discussed return precautions with the patient, and she verbalized understanding.    Differential diagnoses, supportive care, and indications for immediate follow-up discussed with patient.   Instructed to return to clinic or nearest emergency department for any change in condition, further concerns, or worsening of symptoms.    OTC Tylenol or Motrin for fever/discomfort.  OTC cough/cold medication for symptomatic relief  OTC Supportive Care for Congestion - saline nasal spray or neti pot  Drink plenty of fluids  Follow-up with PCP  Return to clinic or go to the ED if symptoms worsen or fail to improve, or if the patient should develop worsening/increasing cough, congestion, ear pain, sore throat, shortness of breath, wheezing, chest pain, fever/chills, and/or any concerning symptoms.    Discussed plan with the patient, and she agrees to the above.     I personally reviewed prior external notes and test results pertinent to today's visit.  I have independently reviewed and interpreted all diagnostics ordered during this urgent care visit.     Please note that this dictation was created using voice recognition software. I have made every reasonable attempt to correct obvious errors, but I expect that there may be errors of grammar and possibly content that I did not discover before finalizing the note.       This note was electronically signed by Shayla Jaquez PA-C

## 2024-12-04 ENCOUNTER — OFFICE VISIT (OUTPATIENT)
Dept: MEDICAL GROUP | Facility: CLINIC | Age: 44
End: 2024-12-04
Payer: MEDICAID

## 2024-12-04 ENCOUNTER — RESEARCH ENCOUNTER (OUTPATIENT)
Dept: RESEARCH | Facility: MEDICAL CENTER | Age: 44
End: 2024-12-04

## 2024-12-04 VITALS
HEART RATE: 77 BPM | BODY MASS INDEX: 41.1 KG/M2 | OXYGEN SATURATION: 97 % | SYSTOLIC BLOOD PRESSURE: 122 MMHG | RESPIRATION RATE: 18 BRPM | TEMPERATURE: 97.4 F | HEIGHT: 66 IN | WEIGHT: 255.73 LBS | DIASTOLIC BLOOD PRESSURE: 84 MMHG

## 2024-12-04 DIAGNOSIS — Z13.79 GENETIC SCREENING: ICD-10-CM

## 2024-12-04 DIAGNOSIS — R63.5 WEIGHT GAIN: ICD-10-CM

## 2024-12-04 DIAGNOSIS — F41.9 ANXIETY: ICD-10-CM

## 2024-12-04 DIAGNOSIS — R10.2 PELVIC PAIN: ICD-10-CM

## 2024-12-04 DIAGNOSIS — N63.0 MASS OF BREAST, UNSPECIFIED LATERALITY: ICD-10-CM

## 2024-12-04 DIAGNOSIS — G25.0 BENIGN ESSENTIAL TREMOR: ICD-10-CM

## 2024-12-04 DIAGNOSIS — N39.46 MIXED STRESS AND URGE INCONTINENCE: ICD-10-CM

## 2024-12-04 DIAGNOSIS — Z00.6 RESEARCH STUDY PATIENT: ICD-10-CM

## 2024-12-04 DIAGNOSIS — R53.83 OTHER FATIGUE: ICD-10-CM

## 2024-12-04 PROBLEM — G43.909 MIGRAINE: Status: ACTIVE | Noted: 2024-12-04

## 2024-12-04 PROBLEM — F31.9 BIPOLAR 1 DISORDER (HCC): Status: ACTIVE | Noted: 2024-12-04

## 2024-12-04 PROBLEM — N39.3 STRESS INCONTINENCE OF URINE: Status: ACTIVE | Noted: 2024-12-04

## 2024-12-04 PROBLEM — K21.9 GASTROESOPHAGEAL REFLUX DISEASE: Status: ACTIVE | Noted: 2024-12-04

## 2024-12-04 PROBLEM — E66.01 MORBID OBESITY (HCC): Status: ACTIVE | Noted: 2024-12-04

## 2024-12-04 PROCEDURE — 3074F SYST BP LT 130 MM HG: CPT | Performed by: PHYSICIAN ASSISTANT

## 2024-12-04 PROCEDURE — 99214 OFFICE O/P EST MOD 30 MIN: CPT | Performed by: PHYSICIAN ASSISTANT

## 2024-12-04 PROCEDURE — 3079F DIAST BP 80-89 MM HG: CPT | Performed by: PHYSICIAN ASSISTANT

## 2024-12-04 RX ORDER — PROPRANOLOL HYDROCHLORIDE 60 MG/1
60 CAPSULE, EXTENDED RELEASE ORAL DAILY
Qty: 90 CAPSULE | Refills: 1 | Status: SHIPPED | OUTPATIENT
Start: 2024-12-04

## 2024-12-04 ASSESSMENT — ANXIETY QUESTIONNAIRES
2. NOT BEING ABLE TO STOP OR CONTROL WORRYING: NEARLY EVERY DAY
3. WORRYING TOO MUCH ABOUT DIFFERENT THINGS: NEARLY EVERY DAY
7. FEELING AFRAID AS IF SOMETHING AWFUL MIGHT HAPPEN: NOT AT ALL
6. BECOMING EASILY ANNOYED OR IRRITABLE: NEARLY EVERY DAY
1. FEELING NERVOUS, ANXIOUS, OR ON EDGE: NEARLY EVERY DAY
IF YOU CHECKED OFF ANY PROBLEMS ON THIS QUESTIONNAIRE, HOW DIFFICULT HAVE THESE PROBLEMS MADE IT FOR YOU TO DO YOUR WORK, TAKE CARE OF THINGS AT HOME, OR GET ALONG WITH OTHER PEOPLE: VERY DIFFICULT
GAD7 TOTAL SCORE: 18
4. TROUBLE RELAXING: NEARLY EVERY DAY
5. BEING SO RESTLESS THAT IT IS HARD TO SIT STILL: NEARLY EVERY DAY

## 2024-12-04 ASSESSMENT — PATIENT HEALTH QUESTIONNAIRE - PHQ9: CLINICAL INTERPRETATION OF PHQ2 SCORE: 0

## 2024-12-04 NOTE — LETTER
[employer] [facility]  Vicky Ramos P.A.-C.  [address]  [facility] [address]  Fax: [phone]   Authorization for Release/Disclosure of   Protected Health Information   Name: ALIZA NICOLE : 1980 SSN: [id number]   Address: [address] [place] [address] Phone:    [phone] (home)    I authorize the entity listed below to release/disclose the PHI below to:   [facility]/Vicky Raoms P.A.-C. and Vicky Ramos P.A.-C.   Provider or Entity Name:  [facility]      Address   City, State, Zip   Phone:      Fax:     Reason for request: continuity of care   Information to be released:    [  ] LAST COLONOSCOPY,  including any PATH REPORT and follow-up  [  ] LAST FIT/COLOGUARD RESULT [  ] LAST DEXA  [  ] LAST MAMMOGRAM  [  ] LAST PAP  [  ] LAST LABS [  ] RETINA EXAM REPORT  [  ] IMMUNIZATION RECORDS  [ x ] Release all info      [  ] Check here and initial the line next to each item to release ALL health information INCLUDING  _____ Care and treatment for drug and / or alcohol abuse  _____ HIV testing, infection status, or AIDS  _____ Genetic Testing    DATES OF SERVICE OR TIME PERIOD TO BE DISCLOSED: _____________  I understand and acknowledge that:  * This Authorization may be revoked at any time by you in writing, except if your health information has already been used or disclosed.  * Your health information that will be used or disclosed as a result of you signing this authorization could be re-disclosed by the recipient. If this occurs, your re-disclosed health information may no longer be protected by State or Federal laws.  * You may refuse to sign this Authorization. Your refusal will not affect your ability to obtain treatment.  * This Authorization becomes effective upon signing and will  on (date) __________.      If no date is indicated, this Authorization will  one (1) year from the signature date.    Name: Aliza Nicole  Signature: Date:   2024      PLEASE FAX REQUESTED RECORDS BACK TO: [phone]

## 2024-12-04 NOTE — RESEARCH NOTE
Patient has signed the consent form. The applicable research collection order(s) have been created.    Patient is ready for scheduling.

## 2024-12-04 NOTE — RESEARCH NOTE
Patient has been referred by Vicky Ramos P.A.-C. The initial referral follow-up message, which includes the consent form link, has been sent to the patient.    Eligible Studies: HNP

## 2024-12-04 NOTE — LETTER
[facility]  Vicky Ramos P.A.-C.  [address]  [facility] [address]  Fax: [phone]   Authorization for Release/Disclosure of   Protected Health Information   Name: ALIZA NICOLE : 1980 SSN: [id number]   Address: [address] [place] [address] Phone:    [phone] (home)    I authorize the entity listed below to release/disclose the PHI below to:   Count includes the [facility]/Vicky Ramos P.A.-C. and Vicky Ramos P.A.-C.   Provider or Entity Name: [facility]      Address   City, State, Zip   Phone:      Fax:     Reason for request: continuity of care   Information to be released:    [  ] LAST COLONOSCOPY,  including any PATH REPORT and follow-up  [  ] LAST FIT/COLOGUARD RESULT [  ] LAST DEXA  [  ] LAST MAMMOGRAM  [  ] LAST PAP  [  ] LAST LABS [  ] RETINA EXAM REPORT  [  ] IMMUNIZATION RECORDS  [ xx ] Release all info      [  ] Check here and initial the line next to each item to release ALL health information INCLUDING  _____ Care and treatment for drug and / or alcohol abuse  _____ HIV testing, infection status, or AIDS  _____ Genetic Testing    DATES OF SERVICE OR TIME PERIOD TO BE DISCLOSED: ____2019-current _________  I understand and acknowledge that:  * This Authorization may be revoked at any time by you in writing, except if your health information has already been used or disclosed.  * Your health information that will be used or disclosed as a result of you signing this authorization could be re-disclosed by the recipient. If this occurs, your re-disclosed health information may no longer be protected by State or Federal laws.  * You may refuse to sign this Authorization. Your refusal will not affect your ability to obtain treatment.  * This Authorization becomes effective upon signing and will  on (date) __________.      If no date is indicated, this Authorization will  one (1) year from the signature date.    Name: Aliza Nicole  Signature: Date:    12/4/2024     PLEASE FAX REQUESTED RECORDS BACK TO: [phone]

## 2024-12-04 NOTE — PROGRESS NOTES
cc:  anxiety    Subjective:     Aliza Garrett is a 44 y.o. female presenting for anxiety        History of Present Illness  The patient presents for evaluation of multiple medical concerns.    She underwent a partial hysterectomy in , during which only her uterus was removed. She is currently experiencing pelvic pain, similar to the discomfort she felt when she had ovarian cysts in the past. The pain, described as a constant dull ache, has been present for about 2 weeks. She has a high tolerance for pain and does not experience any discomfort during urination. She reports no presence of blood in her urine or stool. Her last colonoscopy was performed at [facility].    She has a breast mass that was discovered during a mammogram. Initially, she had follow-up mammograms every 6 months, but this was later changed to annually as the mass was not growing. Her last mammogram was over a year ago.    She has been experiencing anxiety, which she believes is due to the recent loss of her father to cancer. She has previously been on fluoxetine and paroxetine but discontinued these medications due to suicidal ideation. She has not had her thyroid checked recently. She also reports frequent urination, particularly at night, even though she drinks a lot of water. She occasionally experiences urinary incontinence when sneezing or coughing. Her gynecologist has confirmed that her bladder has not prolapsed.    She has a tremor for which she was previously prescribed a beta blocker. She occasionally experiences a rapid heart rate, usually when she is anxious or overheated. She also reports an increase in blood pressure during these episodes. She is interested in resuming medication for her tremor, as it causes her discomfort. The severity of the tremor varies from day to day. She has a device at home to monitor her blood pressure.    She had knee surgery on her ACL.    FAMILY HISTORY  Her father  of cancer. She has a  "family history of heart problems and strokes.       Review of systems:  See above.   Denies any symptoms unless previously indicated.        Current Outpatient Medications:     propranolol LA (INDERAL LA) 60 MG CAPSULE SR 24 HR, Take 1 Capsule by mouth every day., Disp: 90 Capsule, Rfl: 1    fluticasone (FLONASE) 50 MCG/ACT nasal spray, Administer 1 Spray into affected nostril(S) 2 times a day., Disp: 16 g, Rfl: 0    benzonatate (TESSALON) 100 MG Cap, Take 1 Capsule by mouth 3 times a day as needed for Cough. (Patient not taking: Reported on 12/4/2024), Disp: 21 Capsule, Rfl: 0    Lactated Ringers (LR) Solution, , Disp: , Rfl:     ibuprofen (MOTRIN) 600 MG Tab, Take 1 Tab by mouth every 8 hours as needed for Mild Pain, Moderate Pain or Inflammation. Take with food. (Patient not taking: Reported on 12/4/2024), Disp: 30 Tab, Rfl: 0    Allergies, past medical history, past surgical history, family history, social history reviewed and updated    Objective:     Vitals: /84 (BP Location: Left arm, Patient Position: Sitting, BP Cuff Size: Large adult)   Pulse 77   Temp 36.3 °C (97.4 °F) (Temporal)   Resp 18   Ht 1.676 m (5' 6\")   Wt 116 kg (255 lb 11.7 oz)   SpO2 97%   BMI 41.28 kg/m²   General: Alert, pleasant, NAD  EYES:   PERRL, EOMI, no icterus or pallor.  Conjunctivae and lids normal.   HENT:  Normocephalic.  External ears normal.  Neck supple.    Respiratory: Normal respiratory effort.   Abdomen:  obese  Skin: Warm, dry, no rashes.  Musculoskeletal: Gait is normal.  Moves all extremities well.    Extremities: tremor.   Neurological: No tremors, sensation grossly intact,  CN2-12 intact.  Psych:  Affect/mood is normal, judgement is good, memory is intact, grooming is appropriate.      Assessment/Plan:     Aliza was seen today for establish care.    Diagnoses and all orders for this visit:    Pelvic pain  -     Referral to Psychiatry  -     US-PELVIC COMPLETE (TRANSABDOMINAL/TRANSVAGINAL) (COMBO); " Future  -     URINE CULTURE(NEW)  -     URINALYSIS,CULTURE IF INDICATED; Future    Mass of breast, unspecified laterality    Anxiety    Benign essential tremor  -     propranolol LA (INDERAL LA) 60 MG CAPSULE SR 24 HR; Take 1 Capsule by mouth every day.    Other fatigue  -     CBC WITH DIFFERENTIAL; Future  -     TSH WITH REFLEX TO FT4; Future  -     VITAMIN D,25 HYDROXY (DEFICIENCY); Future  -     Comp Metabolic Panel; Future  -     Lipid Profile; Future    Weight gain  -     CBC WITH DIFFERENTIAL; Future  -     TSH WITH REFLEX TO FT4; Future  -     VITAMIN D,25 HYDROXY (DEFICIENCY); Future  -     Comp Metabolic Panel; Future  -     Lipid Profile; Future    Mixed stress and urge incontinence    Genetic screening  -     Referral to Genetic Research Studies    Other orders  -     Cancel: INFLUENZA VACCINE TRI INJ (PF)         Assessment & Plan  1. Pelvic pain.  Given her history of partial hysterectomy, the ovaries may have atrophied. However, the presence of a cyst cannot be ruled out. A pelvic ultrasound will be ordered to further investigate the cause of her pain. She reports a constant dull pain in the area where her ovaries are located, which has been present for about 2 weeks. The ultrasound will help determine if the ovaries are still present and if there is any cyst.    2. Breast mass.  A records release form will be provided for her to sign, allowing us to obtain her previous medical records. A mammogram will be ordered once these records are received. She has been having yearly mammograms, with the last one being a little over a year ago.    3. Anxiety.  A referral to psychiatry will be made for further management of her anxiety. She has a history of suicidal ideation with fluoxetine and paroxetine, making it necessary to involve a psychiatrist for medication management. General labs, including complete blood count, thyroid, vitamin D, liver and kidney function, and cholesterol, will be ordered. A urine  test will also be conducted to rule out any underlying conditions contributing to her symptoms.    4. Tremor.  Propranolol will be initiated at a low dose of 60 mg, with a plan to gradually increase the dose to 120 mg. This medication may also alleviate some of her anxiety symptoms. She is advised to monitor her blood pressure to ensure it does not drop below 100/70. She reports that the tremor is significant enough to warrant medication.    5.  Fatigue/weight gain  Lab work has been ordered to evaluate further.  Follow-up in approximately 6 weeks with test results.    6.  Mixed stress and urge incontinence  We will obtain a urine to evaluate further.  Patient will also have a pelvic ultrasound.  If testing is negative can consider medication.  Can also consider referral to urology.    Health Maintenance.  General labs, including complete blood count, thyroid, vitamin D, liver and kidney function, and cholesterol, will be ordered. A urine test will also be conducted.    Follow-up  Return in 6 weeks for follow-up.    Return in about 6 weeks (around 1/15/2025), or if symptoms worsen or fail to improve.    Please note that this dictation was created using voice recognition software. I have made every reasonable attempt to correct obvious errors, but expect that there are errors of grammar and possible content that I did not discover before finalizing note.

## 2024-12-05 ENCOUNTER — HOSPITAL ENCOUNTER (OUTPATIENT)
Dept: LAB | Facility: MEDICAL CENTER | Age: 44
End: 2024-12-05
Attending: FAMILY MEDICINE

## 2024-12-05 ENCOUNTER — HOSPITAL ENCOUNTER (OUTPATIENT)
Dept: LAB | Facility: MEDICAL CENTER | Age: 44
End: 2024-12-05
Attending: PHYSICIAN ASSISTANT
Payer: MEDICAID

## 2024-12-05 DIAGNOSIS — R63.5 WEIGHT GAIN: ICD-10-CM

## 2024-12-05 DIAGNOSIS — R53.83 OTHER FATIGUE: ICD-10-CM

## 2024-12-05 DIAGNOSIS — R10.2 PELVIC PAIN: ICD-10-CM

## 2024-12-05 DIAGNOSIS — Z00.6 RESEARCH STUDY PATIENT: ICD-10-CM

## 2024-12-05 LAB
25(OH)D3 SERPL-MCNC: 14 NG/ML (ref 30–100)
ALBUMIN SERPL BCP-MCNC: 4.4 G/DL (ref 3.2–4.9)
ALBUMIN/GLOB SERPL: 1.3 G/DL
ALP SERPL-CCNC: 87 U/L (ref 30–99)
ALT SERPL-CCNC: 14 U/L (ref 2–50)
ANION GAP SERPL CALC-SCNC: 8 MMOL/L (ref 7–16)
APPEARANCE UR: CLEAR
AST SERPL-CCNC: 14 U/L (ref 12–45)
BACTERIA #/AREA URNS HPF: ABNORMAL /HPF
BASOPHILS # BLD AUTO: 0.7 % (ref 0–1.8)
BASOPHILS # BLD: 0.08 K/UL (ref 0–0.12)
BILIRUB SERPL-MCNC: 0.3 MG/DL (ref 0.1–1.5)
BILIRUB UR QL STRIP.AUTO: NEGATIVE
BUN SERPL-MCNC: 11 MG/DL (ref 8–22)
CALCIUM ALBUM COR SERPL-MCNC: 8.9 MG/DL (ref 8.5–10.5)
CALCIUM SERPL-MCNC: 9.2 MG/DL (ref 8.5–10.5)
CASTS URNS QL MICRO: ABNORMAL /LPF (ref 0–2)
CHLORIDE SERPL-SCNC: 101 MMOL/L (ref 96–112)
CHOLEST SERPL-MCNC: 215 MG/DL (ref 100–199)
CO2 SERPL-SCNC: 25 MMOL/L (ref 20–33)
COLOR UR: YELLOW
CREAT SERPL-MCNC: 0.64 MG/DL (ref 0.5–1.4)
EOSINOPHIL # BLD AUTO: 0.16 K/UL (ref 0–0.51)
EOSINOPHIL NFR BLD: 1.4 % (ref 0–6.9)
EPITHELIAL CELLS 1715: ABNORMAL /HPF (ref 0–5)
ERYTHROCYTE [DISTWIDTH] IN BLOOD BY AUTOMATED COUNT: 43.9 FL (ref 35.9–50)
FASTING STATUS PATIENT QL REPORTED: NORMAL
GFR SERPLBLD CREATININE-BSD FMLA CKD-EPI: 111 ML/MIN/1.73 M 2
GLOBULIN SER CALC-MCNC: 3.4 G/DL (ref 1.9–3.5)
GLUCOSE SERPL-MCNC: 89 MG/DL (ref 65–99)
GLUCOSE UR STRIP.AUTO-MCNC: NEGATIVE MG/DL
HCT VFR BLD AUTO: 44 % (ref 37–47)
HDLC SERPL-MCNC: 43 MG/DL
HGB BLD-MCNC: 14.5 G/DL (ref 12–16)
IMM GRANULOCYTES # BLD AUTO: 0.18 K/UL (ref 0–0.11)
IMM GRANULOCYTES NFR BLD AUTO: 1.5 % (ref 0–0.9)
KETONES UR STRIP.AUTO-MCNC: NEGATIVE MG/DL
LDLC SERPL CALC-MCNC: 134 MG/DL
LEUKOCYTE ESTERASE UR QL STRIP.AUTO: ABNORMAL
LYMPHOCYTES # BLD AUTO: 3.34 K/UL (ref 1–4.8)
LYMPHOCYTES NFR BLD: 28.7 % (ref 22–41)
MCH RBC QN AUTO: 31 PG (ref 27–33)
MCHC RBC AUTO-ENTMCNC: 33 G/DL (ref 32.2–35.5)
MCV RBC AUTO: 94 FL (ref 81.4–97.8)
MICRO URNS: ABNORMAL
MONOCYTES # BLD AUTO: 0.74 K/UL (ref 0–0.85)
MONOCYTES NFR BLD AUTO: 6.4 % (ref 0–13.4)
NEUTROPHILS # BLD AUTO: 7.14 K/UL (ref 1.82–7.42)
NEUTROPHILS NFR BLD: 61.3 % (ref 44–72)
NITRITE UR QL STRIP.AUTO: NEGATIVE
NRBC # BLD AUTO: 0 K/UL
NRBC BLD-RTO: 0 /100 WBC (ref 0–0.2)
PH UR STRIP.AUTO: 6.5 [PH] (ref 5–8)
PLATELET # BLD AUTO: 339 K/UL (ref 164–446)
PMV BLD AUTO: 10.8 FL (ref 9–12.9)
POTASSIUM SERPL-SCNC: 4 MMOL/L (ref 3.6–5.5)
PROT SERPL-MCNC: 7.8 G/DL (ref 6–8.2)
PROT UR QL STRIP: NEGATIVE MG/DL
RBC # BLD AUTO: 4.68 M/UL (ref 4.2–5.4)
RBC # URNS HPF: ABNORMAL /HPF (ref 0–2)
RBC UR QL AUTO: NEGATIVE
SODIUM SERPL-SCNC: 134 MMOL/L (ref 135–145)
SP GR UR STRIP.AUTO: 1.01
TRIGL SERPL-MCNC: 189 MG/DL (ref 0–149)
TSH SERPL DL<=0.005 MIU/L-ACNC: 1.45 UIU/ML (ref 0.38–5.33)
UROBILINOGEN UR STRIP.AUTO-MCNC: 0.2 EU/DL
WBC # BLD AUTO: 11.6 K/UL (ref 4.8–10.8)
WBC #/AREA URNS HPF: ABNORMAL /HPF

## 2024-12-05 PROCEDURE — 36415 COLL VENOUS BLD VENIPUNCTURE: CPT

## 2024-12-05 PROCEDURE — 84443 ASSAY THYROID STIM HORMONE: CPT

## 2024-12-05 PROCEDURE — 80061 LIPID PANEL: CPT

## 2024-12-05 PROCEDURE — 80053 COMPREHEN METABOLIC PANEL: CPT

## 2024-12-05 PROCEDURE — 85025 COMPLETE CBC W/AUTO DIFF WBC: CPT

## 2024-12-05 PROCEDURE — 82306 VITAMIN D 25 HYDROXY: CPT

## 2024-12-05 PROCEDURE — 81001 URINALYSIS AUTO W/SCOPE: CPT

## 2024-12-09 ENCOUNTER — PATIENT MESSAGE (OUTPATIENT)
Dept: MEDICAL GROUP | Facility: CLINIC | Age: 44
End: 2024-12-09
Payer: MEDICAID

## 2024-12-09 DIAGNOSIS — R10.2 PELVIC PAIN: ICD-10-CM

## 2024-12-25 LAB
APOB+LDLR+PCSK9 GENE MUT ANL BLD/T: NOT DETECTED
BRCA1+BRCA2 DEL+DUP + FULL MUT ANL BLD/T: NOT DETECTED
MLH1+MSH2+MSH6+PMS2 GN DEL+DUP+FUL M: NOT DETECTED

## 2025-01-16 ENCOUNTER — HOSPITAL ENCOUNTER (OUTPATIENT)
Dept: RADIOLOGY | Facility: MEDICAL CENTER | Age: 45
End: 2025-01-16
Attending: PHYSICIAN ASSISTANT
Payer: MEDICAID

## 2025-01-16 DIAGNOSIS — R10.2 PELVIC PAIN: ICD-10-CM

## 2025-01-16 PROCEDURE — 76830 TRANSVAGINAL US NON-OB: CPT

## 2025-01-22 ENCOUNTER — OFFICE VISIT (OUTPATIENT)
Dept: MEDICAL GROUP | Facility: CLINIC | Age: 45
End: 2025-01-22
Payer: MEDICAID

## 2025-01-22 VITALS
HEIGHT: 66 IN | SYSTOLIC BLOOD PRESSURE: 118 MMHG | RESPIRATION RATE: 18 BRPM | WEIGHT: 271.83 LBS | BODY MASS INDEX: 43.69 KG/M2 | TEMPERATURE: 97.4 F | OXYGEN SATURATION: 96 % | HEART RATE: 56 BPM | DIASTOLIC BLOOD PRESSURE: 70 MMHG

## 2025-01-22 DIAGNOSIS — Z12.31 ENCOUNTER FOR SCREENING MAMMOGRAM FOR BREAST CANCER: ICD-10-CM

## 2025-01-22 DIAGNOSIS — Z23 NEED FOR VACCINATION: ICD-10-CM

## 2025-01-22 DIAGNOSIS — D72.829 LEUKOCYTOSIS, UNSPECIFIED TYPE: ICD-10-CM

## 2025-01-22 DIAGNOSIS — N83.202 BILATERAL OVARIAN CYSTS: ICD-10-CM

## 2025-01-22 DIAGNOSIS — N83.201 BILATERAL OVARIAN CYSTS: ICD-10-CM

## 2025-01-22 DIAGNOSIS — E55.9 VITAMIN D DEFICIENCY: ICD-10-CM

## 2025-01-22 DIAGNOSIS — E87.1 HYPONATREMIA: ICD-10-CM

## 2025-01-22 DIAGNOSIS — E66.01 MORBID OBESITY WITH BMI OF 40.0-44.9, ADULT (HCC): ICD-10-CM

## 2025-01-22 DIAGNOSIS — E78.5 HYPERLIPIDEMIA, UNSPECIFIED HYPERLIPIDEMIA TYPE: ICD-10-CM

## 2025-01-22 PROCEDURE — 90715 TDAP VACCINE 7 YRS/> IM: CPT

## 2025-01-22 PROCEDURE — 90471 IMMUNIZATION ADMIN: CPT

## 2025-01-22 PROCEDURE — 3074F SYST BP LT 130 MM HG: CPT | Performed by: PHYSICIAN ASSISTANT

## 2025-01-22 PROCEDURE — 99214 OFFICE O/P EST MOD 30 MIN: CPT | Mod: 25 | Performed by: PHYSICIAN ASSISTANT

## 2025-01-22 PROCEDURE — 90746 HEPB VACCINE 3 DOSE ADULT IM: CPT

## 2025-01-22 PROCEDURE — 3078F DIAST BP <80 MM HG: CPT | Performed by: PHYSICIAN ASSISTANT

## 2025-01-22 PROCEDURE — 90472 IMMUNIZATION ADMIN EACH ADD: CPT

## 2025-01-22 RX ORDER — HYDROXYZINE HYDROCHLORIDE 25 MG/1
25 TABLET, FILM COATED ORAL DAILY
COMMUNITY

## 2025-01-22 RX ORDER — ESCITALOPRAM OXALATE 5 MG/5ML
SOLUTION ORAL
COMMUNITY
Start: 2024-12-24 | End: 2025-01-22

## 2025-01-22 ASSESSMENT — FIBROSIS 4 INDEX: FIB4 SCORE: 0.5

## 2025-01-22 NOTE — LETTER
[facility]  Vicky Ramos P.A.-C.  [address]  [facility] [address]  Fax: [phone]   Authorization for Release/Disclosure of   Protected Health Information   Name: ALIZA NICOLE : 1980 SSN: [id number]   Address: [address] [place] [id number] Phone:    [phone] (home)    I authorize the entity listed below to release/disclose the PHI below to:   [facility]/Vicky Ramos P.A.-C. and Vicky Ramos P.A.-C.   Provider or Entity Name:  [facility]     Address   City, State, Zip   Phone:      Fax:     Reason for request: continuity of care   Information to be released:    [ x ] LAST COLONOSCOPY,  including any PATH REPORT and follow-up  [  ] LAST FIT/COLOGUARD RESULT [  ] LAST DEXA  [  ] LAST MAMMOGRAM  [  ] LAST PAP  [  ] LAST LABS [  ] RETINA EXAM REPORT  [  ] IMMUNIZATION RECORDS  [  ] Release all info      [  ] Check here and initial the line next to each item to release ALL health information INCLUDING  _____ Care and treatment for drug and / or alcohol abuse  _____ HIV testing, infection status, or AIDS  _____ Genetic Testing    DATES OF SERVICE OR TIME PERIOD TO BE DISCLOSED: _____________  I understand and acknowledge that:  * This Authorization may be revoked at any time by you in writing, except if your health information has already been used or disclosed.  * Your health information that will be used or disclosed as a result of you signing this authorization could be re-disclosed by the recipient. If this occurs, your re-disclosed health information may no longer be protected by State or Federal laws.  * You may refuse to sign this Authorization. Your refusal will not affect your ability to obtain treatment.  * This Authorization becomes effective upon signing and will  on (date) __________.      If no date is indicated, this Authorization will  one (1) year from the signature date.    Name: Aliza Nicole  Signature: Date:   2025     PLEASE  FAX REQUESTED RECORDS BACK TO: [phone]

## 2025-01-22 NOTE — PROGRESS NOTES
cc:  ovarian cyst    Subjective:     Aliza Garrett is a 44 y.o. female presenting for ovarian cyst        History of Present Illness  The patient is a 44-year-old female who presents to the office today to follow up with pelvic pain. She also had labs drawn before coming in to be seen. Patient is also due for her Tdap and first Hep B vaccine.    She reports experiencing pain, which she attributes to the presence of cysts. She expresses a preference for not disregarding this issue and is open to a referral to gynecology for further evaluation. She recalls an unusual uterine condition necessitating a  for childbirth. The uterus was described as having a lip, which led to its removal due to associated pain. She speculates on a potential link between these past events and her current symptoms. She does not remember if her hormone levels were ever checked or any lab work was done.    She has been struggling with weight loss and admits to occasional lapses in her diet due to her love for food. She makes a conscious effort to avoid adding salt to her meals. She recalls an incident where she consumed plain pork rinds, which she found excessively salty, leading to significant ankle swelling by the end of the day.    She is currently not on any cholesterol medication. She has a family history of heart disease, with both paternal grandparents and her maternal grandmother having suffered from it. She is uncertain about the cause of death of her maternal grandfather but suspects it may have been heart-related.    She has discontinued the use of Lexapro and is currently only taking propranolol, along with two new medications prescribed by her psychiatrist.    She has requested a mammogram as she has not undergone her annual screening yet.    FAMILY HISTORY  Both her paternal grandparents and her maternal grandmother had a history of heart disease. She is unsure about her maternal grandfather's cause of death but  "believes it might be heart-related.    MEDICATIONS  Current: propranolol  Past: escitalopram    IMMUNIZATIONS  She is due for her Tdap and first Hep B vaccine.       Review of systems:  See above.   Denies any symptoms unless previously indicated.        Current Outpatient Medications:     hydrOXYzine HCl (ATARAX) 25 MG Tab, Take 25 mg by mouth every day., Disp: , Rfl:     propranolol LA (INDERAL LA) 60 MG CAPSULE SR 24 HR, Take 1 Capsule by mouth every day., Disp: 90 Capsule, Rfl: 1    Allergies, past medical history, past surgical history, family history, social history reviewed and updated    Objective:     Vitals: /70 (BP Location: Left arm, Patient Position: Sitting, BP Cuff Size: Large adult)   Pulse (!) 56   Temp 36.3 °C (97.4 °F) (Temporal)   Resp 18   Ht 1.676 m (5' 6\")   Wt 123 kg (271 lb 13.2 oz)   SpO2 96%   BMI 43.87 kg/m²   General: Alert, pleasant, NAD  EYES:   PERRL, EOMI, no icterus or pallor.  Conjunctivae and lids normal.   HENT:  Normocephalic.  External ears normal.   Neck supple.    Respiratory: Normal respiratory effort.    Abdomen: obese  Skin: Warm, dry, no rashes.  Musculoskeletal: Gait is normal.  Moves all extremities well.    Extremities: normal range of motion all extremities.   Neurological: No tremors, sensation grossly intact, CN2-12 intact.  Psych:  Affect/mood is normal, judgement is good, memory is intact, grooming is appropriate.    Results  Laboratory Studies  Vitamin D is low. Thyroid is normal. Cholesterol is elevated with total cholesterol at 215, triglycerides at 189, and HDL at 43. White blood cell count is elevated. Red blood cells are normal. Differentials are normal. Urine test showed a little bit of leukocyte esterase. Sodium was down by one point. Kidney function is normal. Liver function is normal. Fasting sugar was 89.    Imaging  Pelvic ultrasound on 01/16/2025 showed two right ovarian cysts measuring 2.4 cm and 4.0 cm, a 1.7 cm right paraovarian simple " cyst and a 2.6 cm left ovarian cyst.   US-PELVIC COMPLETE (TRANSABDOMINAL/TRANSVAGINAL) (COMBO)  Order: [id number]   Status: Final result       Visible to patient: Yes (seen)       Next appt: None       Dx: Pelvic pain    0 Result Notes  Details    Reading Physician Reading Date Result Priority   Rich De La Cruz M.D.  [phone] 1/16/2025      Narrative & Impression     1/16/2025 9:56 AM     HISTORY/REASON FOR EXAM:  Pain; pelvic pain with hysterectomy        TECHNIQUE/EXAM DESCRIPTION:  Transabdominal and transvaginal pelvic ultrasound.     COMPARISON:   None     FINDINGS:  Both transabdominal and transvaginal scanning were performed to optimally visualize the pelvis.     UTERUS:  The uterus is surgically absent     OVARIES:  The right ovary measures 5.88 cm x 3.88 cm x 3.69 cm. Duplex Doppler examination of the right ovary shows normal waveforms.     There are 2 contiguous mildly complicated right ovarian cysts. Both contain thin internal septations.  4.0 x 3.4 x 3.4 cm and the smaller 2.0 x 2.0 x 2.4 cm.     There is a 1.7 x 1.2 x 1.3 cm simple cyst within the right adnexa.     The left ovary measures 2.61 cm x 1.82 cm x 2.25 cm. Duplex Doppler examination of the left ovary shows normal waveforms.     There is a 2.6 x 1.8 x 2.3 cm simple cyst left ovary.     There is no free fluid seen.     IMPRESSION:     1.  2 mildly complicated right ovarian cysts measuring 4.0 and 2.4 cm.     2.  1.7 cm right paraovarian simple cyst.     3.  2.6 cm cyst left ovary.      Latest Reference Range & Units 12/05/24 11:00   WBC 4.8 - 10.8 K/uL 11.6 (H)   RBC 4.20 - 5.40 M/uL 4.68   Hemoglobin 12.0 - 16.0 g/dL 14.5   Hematocrit 37.0 - 47.0 % 44.0   MCV 81.4 - 97.8 fL 94.0   MCH 27.0 - 33.0 pg 31.0   MCHC 32.2 - 35.5 g/dL 33.0   RDW 35.9 - 50.0 fL 43.9   Platelet Count 164 - 446 K/uL 339   MPV 9.0 - 12.9 fL 10.8   Neutrophils-Polys 44.00 - 72.00 % 61.30   Neutrophils (Absolute) 1.82 - 7.42 K/uL 7.14   Lymphocytes 22.00 - 41.00 % 28.70    Lymphs (Absolute) 1.00 - 4.80 K/uL 3.34   Monocytes 0.00 - 13.40 % 6.40   Monos (Absolute) 0.00 - 0.85 K/uL 0.74   Eosinophils 0.00 - 6.90 % 1.40   Eos (Absolute) 0.00 - 0.51 K/uL 0.16   Basophils 0.00 - 1.80 % 0.70   Baso (Absolute) 0.00 - 0.12 K/uL 0.08   Immature Granulocytes 0.00 - 0.90 % 1.50 (H)   Immature Granulocytes (abs) 0.00 - 0.11 K/uL 0.18 (H)   Nucleated RBC 0.00 - 0.20 /100 WBC 0.00   NRBC (Absolute) K/uL 0.00   Sodium 135 - 145 mmol/L 134 (L)   Potassium 3.6 - 5.5 mmol/L 4.0   Chloride 96 - 112 mmol/L 101   Co2 20 - 33 mmol/L 25   Anion Gap 7.0 - 16.0  8.0   Glucose 65 - 99 mg/dL 89   Bun 8 - 22 mg/dL 11   Creatinine 0.50 - 1.40 mg/dL 0.64   GFR (CKD-EPI) >60 mL/min/1.73 m 2 111   Calcium 8.5 - 10.5 mg/dL 9.2   Correct Calcium 8.5 - 10.5 mg/dL 8.9   AST(SGOT) 12 - 45 U/L 14   ALT(SGPT) 2 - 50 U/L 14   Alkaline Phosphatase 30 - 99 U/L 87   Total Bilirubin 0.1 - 1.5 mg/dL 0.3   Albumin 3.2 - 4.9 g/dL 4.4   Total Protein 6.0 - 8.2 g/dL 7.8   Globulin 1.9 - 3.5 g/dL 3.4   A-G Ratio g/dL 1.3   Fasting Status  Fasting   Cholesterol,Tot 100 - 199 mg/dL 215 (H)   Triglycerides 0 - 149 mg/dL 189 (H)   HDL >=40 mg/dL 43   LDL <100 mg/dL 134 (H)   Urobilinogen, Urine <=1.0 EU/dL 0.2   Color  Yellow   Character  Clear   Specific Gravity <1.035  1.010   Ph 5.0 - 8.0  6.5   Glucose Negative mg/dL Negative   Ketones Negative mg/dL Negative   Bilirubin Negative  Negative   Occult Blood Negative  Negative   Protein Negative mg/dL Negative   Nitrite Negative  Negative   Leukocyte Esterase Negative  Small !   Micro Urine Req  Microscopic   WBC /hpf 3-5   RBC 0 - 2 /hpf 0-2   Epithelial Cells 0 - 5 /hpf 6-10 !   Bacteria None /hpf Few !   Urine Casts 0 - 2 /lpf 0-2   25-Hydroxy   Vitamin D 25 30 - 100 ng/mL 14 (L)   TSH 0.380 - 5.330 uIU/mL 1.450   (H): Data is abnormally high  (L): Data is abnormally low  !: Data is abnormal  Assessment/Plan:     Aliza was seen today for results.    Diagnoses and all orders for  this visit:    Bilateral ovarian cysts  -     Referral to Gynecology  -     US-PELVIC COMPLETE (TRANSABDOMINAL/TRANSVAGINAL) (COMBO); Future    Hyperlipidemia, unspecified hyperlipidemia type  -     Lipid Profile; Future  -     Comp Metabolic Panel; Future    Hyponatremia  -     Comp Metabolic Panel; Future    Leukocytosis, unspecified type  -     CBC WITH DIFFERENTIAL; Future    Encounter for screening mammogram for breast cancer  -     MA-SCREENING MAMMO BILAT W/TOMOSYNTHESIS W/CAD; Future    Vitamin D deficiency    Need for vaccination  -     Tdap Vaccine =>6YO IM  -     Hepatitis B Vaccine Adult 20+        Assessment & Plan  1. Bilateral ovarian cysts.  A pelvic ultrasound on 01/16/2025 showed two right ovarian cysts measuring 2.4 cm and 4.0 cm, a 1.7 cm right paraovarian simple cyst, and a 2.6 cm left ovarian cyst. A referral to gynecology will be initiated for further evaluation. A repeat pelvic ultrasound is scheduled in 6 weeks to monitor the cysts.    2. Hyperlipidemia.  Her cholesterol levels are elevated, with a total cholesterol of 215 mg/dL, triglycerides at 189 mg/dL, and LDL above the desired range. She is advised to improve her diet and exercise regimen over the next 6 months. Labs will be repeated in 6 months to assess progress. If there is no improvement, starting cholesterol medication will be considered.    3. Obesity.  Her BMI is 43.87. Dietary modifications and increased physical activity were discussed. She is encouraged to work on these lifestyle changes and will follow up in 6 months to assess weight changes.    4. Hyponatremia.  Her sodium levels were slightly low. Repeat labs will be conducted in 6 months to monitor sodium levels.    5. Leukocytosis.  Her white blood cell count is elevated. Repeat labs will be conducted in 6 months to monitor white blood cell count.    6. Vitamin D deficiency.  Her vitamin D levels are low. She will start a vitamin D supplement. Labs will be repeated in 6  months to assess vitamin D levels.    7. Health maintenance.  A mammogram has been ordered as she is due for her yearly screening. The first hepatitis B and Tdap vaccines were administered today.    Follow-up  The patient will follow up in 6 months with labs or sooner if needed.    PROCEDURE  The patient underwent a hysterectomy in the past due to an unusual uterine condition.    Return in about 6 months (around 7/22/2025).    Please note that this dictation was created using voice recognition software. I have made every reasonable attempt to correct obvious errors, but expect that there are errors of grammar and possible content that I did not discover before finalizing note.

## 2025-01-29 ENCOUNTER — HOSPITAL ENCOUNTER (OUTPATIENT)
Dept: RADIOLOGY | Facility: MEDICAL CENTER | Age: 45
End: 2025-01-29

## 2025-02-06 ENCOUNTER — HOSPITAL ENCOUNTER (OUTPATIENT)
Dept: RADIOLOGY | Facility: MEDICAL CENTER | Age: 45
End: 2025-02-06
Attending: PHYSICIAN ASSISTANT
Payer: MEDICAID

## 2025-02-06 DIAGNOSIS — Z12.31 ENCOUNTER FOR SCREENING MAMMOGRAM FOR BREAST CANCER: ICD-10-CM

## 2025-02-06 PROCEDURE — 77067 SCR MAMMO BI INCL CAD: CPT

## 2025-02-18 ENCOUNTER — APPOINTMENT (OUTPATIENT)
Dept: MEDICAL GROUP | Facility: CLINIC | Age: 45
End: 2025-02-18
Payer: MEDICAID

## 2025-02-19 ENCOUNTER — NON-PROVIDER VISIT (OUTPATIENT)
Dept: MEDICAL GROUP | Facility: CLINIC | Age: 45
End: 2025-02-19
Payer: MEDICAID

## 2025-02-19 DIAGNOSIS — Z23 NEED FOR VACCINATION: ICD-10-CM

## 2025-02-19 PROCEDURE — 99999 PR NO CHARGE: CPT | Performed by: PHYSICIAN ASSISTANT

## 2025-02-19 PROCEDURE — 90746 HEPB VACCINE 3 DOSE ADULT IM: CPT | Performed by: PHYSICIAN ASSISTANT

## 2025-02-19 PROCEDURE — 90471 IMMUNIZATION ADMIN: CPT | Performed by: PHYSICIAN ASSISTANT

## 2025-02-19 NOTE — PROGRESS NOTES
"Aliza Garrett is a 44 y.o. female here for a non-provider visit for:   HEPATITIS B 2 of 3    Reason for immunization: continue or complete series started at the office  Immunization records indicate need for vaccine: Yes, confirmed with Epic  Minimum interval has been met for this vaccine: Yes  ABN completed: Not Indicated    VIS Dated  5/12/2023 was given to patient: Yes  All IAC Questionnaire questions were answered \"No.\"    Patient tolerated injection and no adverse effects were observed or reported: Yes    Pt scheduled for next dose in series: Yes  "

## 2025-05-06 ENCOUNTER — OFFICE VISIT (OUTPATIENT)
Dept: URGENT CARE | Facility: CLINIC | Age: 45
End: 2025-05-06
Payer: MEDICAID

## 2025-05-06 VITALS
TEMPERATURE: 96.7 F | HEART RATE: 61 BPM | SYSTOLIC BLOOD PRESSURE: 122 MMHG | HEIGHT: 66 IN | BODY MASS INDEX: 45.71 KG/M2 | WEIGHT: 284.39 LBS | OXYGEN SATURATION: 95 % | DIASTOLIC BLOOD PRESSURE: 70 MMHG | RESPIRATION RATE: 14 BRPM

## 2025-05-06 DIAGNOSIS — R10.2 PELVIC PAIN: ICD-10-CM

## 2025-05-06 LAB
APPEARANCE UR: CLEAR
BILIRUB UR STRIP-MCNC: NORMAL MG/DL
COLOR UR AUTO: YELLOW
GLUCOSE UR STRIP.AUTO-MCNC: NORMAL MG/DL
KETONES UR STRIP.AUTO-MCNC: NORMAL MG/DL
LEUKOCYTE ESTERASE UR QL STRIP.AUTO: NORMAL
NITRITE UR QL STRIP.AUTO: NORMAL
PH UR STRIP.AUTO: 6 [PH] (ref 5–8)
PROT UR QL STRIP: NORMAL MG/DL
RBC UR QL AUTO: NORMAL
SP GR UR STRIP.AUTO: 1.02
UROBILINOGEN UR STRIP-MCNC: 0.2 MG/DL

## 2025-05-06 PROCEDURE — 3074F SYST BP LT 130 MM HG: CPT | Performed by: NURSE PRACTITIONER

## 2025-05-06 PROCEDURE — 99213 OFFICE O/P EST LOW 20 MIN: CPT | Performed by: NURSE PRACTITIONER

## 2025-05-06 PROCEDURE — 3078F DIAST BP <80 MM HG: CPT | Performed by: NURSE PRACTITIONER

## 2025-05-06 PROCEDURE — 81002 URINALYSIS NONAUTO W/O SCOPE: CPT | Performed by: NURSE PRACTITIONER

## 2025-05-06 RX ORDER — ESCITALOPRAM OXALATE 10 MG/1
TABLET ORAL
COMMUNITY
Start: 2025-01-16

## 2025-05-06 ASSESSMENT — FIBROSIS 4 INDEX: FIB4 SCORE: 0.5

## 2025-05-06 NOTE — LETTER
May 6, 2025         Patient: Aliza Garrett   YOB: 1980   Date of Visit: 5/6/2025           To Whom it May Concern:    Aliza Garrett was seen in my clinic on 5/6/2025. She may be excused from work yesterday, today, and tomorrow.    If you have any questions or concerns, please don't hesitate to call.        Sincerely,           CARLITOS Frey.  Electronically Signed

## 2025-05-06 NOTE — LETTER
May 6, 2025         Patient: Aliza Garrett   YOB: 1980   Date of Visit: 5/6/2025           To Whom it May Concern:    Aliza Garrett was seen in my clinic on 5/6/2025. She may be excused from work today and tomorrow.     If you have any questions or concerns, please don't hesitate to call.        Sincerely,           CARLITOS Frey.  Electronically Signed

## 2025-05-06 NOTE — PROGRESS NOTES
Chief Complaint   Patient presents with    LLQ Pain     Since yesterday morning, achy, has cyst and thinks they might have ruptured        HISTORY OF PRESENT ILLNESS: Patient is a pleasant 44 y.o. female who presents to urgent care today with complaints of pelvic pain.  The patient notes that since yesterday she has had left sided pelvic pain.  Pain was worse yesterday, somewhat improved today.  Endorses associated nausea and vomiting yesterday due to the pain.  Denies any fever, chills, malaise.  She has a history of ovarian cyst, pain feels similar.  She has a referral to see a gynecologist but has yet to make an appointment.  She took Tylenol for pain relief.    Patient Active Problem List    Diagnosis Date Noted    Bilateral ovarian cysts 01/22/2025    Hyperlipidemia 01/22/2025    Hyponatremia 01/22/2025    Elevated WBC count 01/22/2025    Vitamin D deficiency 01/22/2025    Morbid obesity with BMI of 40.0-44.9, adult ([facility]) 01/22/2025    Bipolar 1 disorder ([facility]) 12/04/2024    Gastroesophageal reflux disease 12/04/2024    Migraine 12/04/2024    Morbid obesity ([facility]) 12/04/2024    Stress incontinence of urine 12/04/2024    Fatigue 12/04/2024    Weight gain 12/04/2024    Pelvic pain 12/04/2024    Mixed stress and urge incontinence 12/04/2024    Benign essential tremor 12/04/2024    Breast mass 12/04/2024    Anxiety 05/22/2014    Depressive disorder 10/17/2013       Allergies:Marijuana (cannabis sativa) and Pcn [penicillins]    Current Outpatient Medications Ordered in Epic   Medication Sig Dispense Refill    escitalopram (LEXAPRO) 10 MG Tab       hydrOXYzine HCl (ATARAX) 25 MG Tab Take 25 mg by mouth every day.      Cholecalciferol (VITAMIN D3) 125 MCG (5000 UT) Cap Take 1 Capsule by mouth every day. 90 Capsule 2    propranolol LA (INDERAL LA) 60 MG CAPSULE SR 24 HR Take 1 Capsule by mouth every day. 90 Capsule 1     No current Epic-ordered facility-administered medications on file.       Past Medical History:  "  Diagnosis Date    Ovarian cyst        Social History     Tobacco Use    Smoking status: Never    Smokeless tobacco: Former     Types: Chew   Vaping Use    Vaping status: Never Used   Substance Use Topics    Alcohol use: No    Drug use: No       Family Status   Relation Name Status    Fa  (Not Specified)    MAunt  (Not Specified)    MGMo  (Not Specified)   No partnership data on file     Family History   Problem Relation Age of Onset    Cancer Father         rectal cancer and melanoma    Cancer Maternal Aunt         breast    Cancer Maternal Grandmother         breast and melanoma       ROS:  Review of Systems   Constitutional: Negative for fever, chills, weight loss, malaise, and fatigue.   HENT: Negative for ear pain, nosebleeds, congestion, sore throat and neck pain.    Eyes: Negative for vision changes.   Neuro: Negative for headache, sensory changes, weakness, seizure, LOC.   Cardiovascular: Negative for chest pain, palpitations, orthopnea and leg swelling.   Respiratory: Negative for cough, sputum production, shortness of breath and wheezing.   Gastrointestinal: Positive for nausea and vomiting yesterday.  Negative for abdominal pain, diarrhea.   Genitourinary: Negative for dysuria, urgency and frequency.  GYN: Positive for pelvic pain  Musculoskeletal: Negative for falls, neck pain, back pain, joint pain, myalgias.   Skin: Negative for rash, diaphoresis.     Exam:  /70   Pulse 61   Temp 35.9 °C (96.7 °F) (Temporal)   Resp 14   Ht 1.676 m (5' 6\")   Wt (!) 129 kg (284 lb 6.3 oz)   SpO2 95%   General: well-nourished, well-developed female in NAD  Head: normocephalic, atraumatic  Eyes: PERRLA, no conjunctival injection, acuity grossly intact, lids normal.  Ears: normal shape and symmetry, no tenderness, no discharge. External canals are without any significant edema or erythema. Tympanic membranes are without any inflammation, no effusion. Gross auditory acuity is intact.  Nose: symmetrical without " tenderness, no discharge.  Mouth/Throat: reasonable hygiene, no erythema, exudates or tonsillar enlargement.  Neck: no masses, range of motion within normal limits, no tracheal deviation. No obvious thyroid enlargement.   Lymph: no cervical adenopathy. No supraclavicular adenopathy.   Neuro: alert and oriented. Cranial nerves 1-12 grossly intact. No sensory deficit.   Cardiovascular: regular rate and rhythm. No edema.  Pulmonary: no distress. Chest is symmetrical with respiration, no wheezes, crackles, or rhonchi.   Abdomen: soft, left sided pelvic tenderness, no guarding, no hepatosplenomegaly.  No CVA tenderness.  Musculoskeletal: no clubbing, appropriate muscle tone, gait is stable.  Skin: warm, dry, intact, no clubbing, no cyanosis, no rashes.   Psych: appropriate mood, affect, judgement.         Assessment/Plan:  1. Pelvic pain  POCT Urinalysis    US-PELVIC COMPLETE (TRANSABDOMINAL/TRANSVAGINAL) (COMBO)          The patient is a pleasant 44-year-old who presents with acute onset of left-sided pelvic pain since yesterday.  Patient has a history of ovarian cyst, states feels similar.  An ultrasound is ordered for patient today, will follow-up accordingly.  In the meantime she may take ibuprofen for symptom relief.  Supportive care, differential diagnoses, and indications for immediate follow-up discussed with patient.   Pathogenesis of diagnosis discussed including typical length and natural progression.   Instructed to return to clinic or nearest emergency department for any change in condition, further concerns, or worsening of symptoms.  Patient states understanding of the plan of care and discharge instructions.  Instructed to make an appointment, for follow up, with gynecology.        Please note that this dictation was created using voice recognition software. I have made every reasonable attempt to correct obvious errors, but I expect that there are errors of grammar and possibly content that I did not  discover before finalizing the note.      CARLITOS Frey.

## 2025-05-07 ENCOUNTER — HOSPITAL ENCOUNTER (OUTPATIENT)
Dept: RADIOLOGY | Facility: MEDICAL CENTER | Age: 45
End: 2025-05-07
Attending: NURSE PRACTITIONER
Payer: MEDICAID

## 2025-05-07 DIAGNOSIS — R10.2 PELVIC PAIN: ICD-10-CM

## 2025-05-07 PROCEDURE — 76830 TRANSVAGINAL US NON-OB: CPT

## 2025-05-08 ENCOUNTER — RESULTS FOLLOW-UP (OUTPATIENT)
Dept: URGENT CARE | Facility: CLINIC | Age: 45
End: 2025-05-08

## 2025-05-27 DIAGNOSIS — G25.0 BENIGN ESSENTIAL TREMOR: ICD-10-CM

## 2025-05-28 RX ORDER — PROPRANOLOL HYDROCHLORIDE 60 MG/1
60 CAPSULE, EXTENDED RELEASE ORAL DAILY
Qty: 90 CAPSULE | Refills: 2 | Status: SHIPPED | OUTPATIENT
Start: 2025-05-28

## 2025-05-28 NOTE — TELEPHONE ENCOUNTER
Received request via: Patient    Was the patient seen in the last year in this department? Yes    Does the patient have an active prescription (recently filled or refills available) for medication(s) requested? Yes    Pharmacy Name: [facility] in [place]    Does the patient have [facility] Plus and need 100-day supply? (This applies to ALL medications) Patient does not have SCP    Last Office Visit: 1/22/25  Last Labs:12/5/24

## 2025-06-23 ENCOUNTER — GYNECOLOGY VISIT (OUTPATIENT)
Dept: GYNECOLOGY | Facility: CLINIC | Age: 45
End: 2025-06-23
Attending: PHYSICIAN ASSISTANT
Payer: MEDICAID

## 2025-06-23 VITALS
HEART RATE: 64 BPM | WEIGHT: 287 LBS | HEIGHT: 66 IN | BODY MASS INDEX: 46.12 KG/M2 | SYSTOLIC BLOOD PRESSURE: 118 MMHG | DIASTOLIC BLOOD PRESSURE: 73 MMHG

## 2025-06-23 DIAGNOSIS — R10.2 CHRONIC PELVIC PAIN IN FEMALE: ICD-10-CM

## 2025-06-23 DIAGNOSIS — R19.00 PELVIC MASS: Primary | ICD-10-CM

## 2025-06-23 DIAGNOSIS — M79.18 MYOFASCIAL PAIN: ICD-10-CM

## 2025-06-23 DIAGNOSIS — N83.209 CYST OF OVARY, UNSPECIFIED LATERALITY: ICD-10-CM

## 2025-06-23 DIAGNOSIS — G89.29 CHRONIC PELVIC PAIN IN FEMALE: ICD-10-CM

## 2025-06-23 PROCEDURE — 99204 OFFICE O/P NEW MOD 45 MIN: CPT | Performed by: STUDENT IN AN ORGANIZED HEALTH CARE EDUCATION/TRAINING PROGRAM

## 2025-06-23 RX ORDER — TRAZODONE HYDROCHLORIDE 50 MG/1
TABLET ORAL
COMMUNITY
Start: 2025-05-14

## 2025-06-23 ASSESSMENT — FIBROSIS 4 INDEX: FIB4 SCORE: 0.51

## 2025-06-23 NOTE — PROGRESS NOTES
Pt here for ovarian cysts + establish care  Pt states: mostly RLQ pain, reports dull ache and sharp pain   Last PAP: 2014 - no hx of abnormals   Birth control: hysterectomy   Hx of STDs: no  Hysterectomy: 12/2014  Mammo: 2/6/25  Colonoscopy: approx. 3 years ago, hx of colon cx (father) - [facility]     Pelvic US: 5/7/25 - 7.1mm nodule, 2.4mm R ovarian cyst, bilat cyst follicles

## 2025-06-23 NOTE — LETTER
June 23, 2025       Patient: Aliza Garrett   YOB: 1980   Date of Visit: 6/23/2025         To Whom It May Concern:    Aliza Garrettis under my care for her current medical condition. Due to her current medical condition, please allow her to take frequent breaks (15 minutes every 2 hours) and avoid lifting anything greater than 10 pounds.           Sincerely,          Laurecne Gibson M.D.  Electronically Signed

## 2025-06-23 NOTE — PROGRESS NOTES
"Minimally Invasive Gynecologic Surgery Consult         CC: Chronic pelvic pain        HPI  Aliza Garrett is a 45 y.o. female  ( x2) presenting today for the above.  Patient with bilateral lower quadrant pain for the last year.  Reports that pain is occasionally sharp, occasionally dull.  Triggered by a bending and physical activity.  Pain not associated with bladder or bowel function.  Denies dyspareunia.  Pain present on a daily basis.  Denies any trauma to the area.  Had pelvic ultrasound x2 done by PCP, suggestive of simple ovarian cysts.    Status post vaginal hysterectomy for \"enlarged uterus\" in .    Imaging  Pelvic US : UTERUS:  The uterus is surgically absent.  There is a 7.1 x 6.0 x 5.0 mm echogenic nodule within the vaginal cuff. Uncertain significance.        OVARIES:  The right ovary measures 3.14 cm x 4.14 cm x 2.61 cm. Duplex Doppler examination of the right ovary shows normal waveforms.  There are 3 cysts within the right ovary.  There is a 2.8 x 2.1 x 2.6 cm simple cyst.  There are 2 smaller cysts/ovarian follicles measuring 1.8 x 1.7 x 1.3 cm and 1.2 x 0.7 x 1.2 cm.     The previously demonstrated 4.0 and 2.4 cm complicated right ovarian cysts are no longer identified.     The left ovary measures 4.64 cm x 2.92 cm x 3.70 cm. Duplex Doppler examination of the left ovary shows normal waveforms.  There is a 2.9 x 2.5 x 2.1 cm simple cyst/ovarian follicle left ovary.           There is no free fluid seen.     IMPRESSION:     1.  Surgical absence of the uterus. 7.1 mm echogenic nodule within the vaginal cuff of uncertain significance.     2.  Previously demonstrated 0.0 and 2.4 cm complicated right ovarian cysts or volar identified..     3.  Bilateral ovarian simple cyst/ovarian follicles.    Menstrual History  No LMP recorded. Patient has had a hysterectomy.        OB History    Para Term  AB Living   2     2   SAB IAB Ectopic Molar Multiple Live Births        2    "   # Outcome Date GA Lbr Primo/2nd Weight Sex Type Anes PTL Lv   2  10/28/06    M Vag-Spont   ARA   1  02    M Vag-Spont   ARA       Past Medical History  Past Medical History[1]    Past Surgical History  Past Surgical History[2]    Social History  Social History[3]     Family History  Family History   Problem Relation Age of Onset    Cancer Father         rectal cancer and melanoma    Cancer Maternal Aunt         breast    Cancer Maternal Grandmother         breast and melanoma       Home Medications  Current Outpatient Medications   Medication Sig    traZODone (DESYREL) 50 MG Tab     propranolol LA (INDERAL LA) 60 MG CAPSULE SR 24 HR Take 1 Capsule by mouth every day.    escitalopram (LEXAPRO) 10 MG Tab     hydrOXYzine HCl (ATARAX) 25 MG Tab Take 25 mg by mouth every day.    Cholecalciferol (VITAMIN D3) 125 MCG (5000 UT) Cap Take 1 Capsule by mouth every day.       Allergies/Reactions  Allergies[4]       ROS: I have reviewed all systems with patient. Pertinent positive and negative findings are listed below except for what is otherwise stated in the History of Present Illness.       Objective:    Labs  Lab Results   Component Value Date/Time    WBC 11.6 (H) 2024 11:00 AM    RBC 4.68 2024 11:00 AM    HEMOGLOBIN 14.5 2024 11:00 AM    HEMATOCRIT 44.0 2024 11:00 AM    MCV 94.0 2024 11:00 AM    MCH 31.0 2024 11:00 AM    MCHC 33.0 2024 11:00 AM    RDW 43.9 2024 11:00 AM    PLATELETCT 339 2024 11:00 AM    MPV 10.8 2024 11:00 AM    NEUTSPOLYS 61.30 2024 11:00 AM    LYMPHOCYTES 28.70 2024 11:00 AM    MONOCYTES 6.40 2024 11:00 AM    EOSINOPHILS 1.40 2024 11:00 AM    BASOPHILS 0.70 2024 11:00 AM    IMMGRAN 1.50 (H) 2024 11:00 AM    NRBC 0.00 2024 11:00 AM    NEUTS 7.14 2024 11:00 AM    LYMPHS 3.34 2024 11:00 AM    MONOS 0.74 2024 11:00 AM    EOS 0.16 2024 11:00 AM    BASO 0.08  "12/05/2024 11:00 AM    IMMGRANAB 0.18 (H) 12/05/2024 11:00 AM    NRBCAB 0.00 12/05/2024 11:00 AM       No results found for: \"HBA1C\"      Physical Exam  /73 (BP Location: Left arm, Patient Position: Sitting, BP Cuff Size: Large adult)   Pulse 64   Ht 5' 6\"   Wt (!) 287 lb   BMI 46.32 kg/m²    No LMP recorded. Patient has had a hysterectomy.  Body mass index is 46.32 kg/m².    General: alert, well appearing, and in no distress  Neurological: alert, oriented, normal speech, no focal findings or movement disorder noted  Respiratory: no tachypnea, retractions or cyanosis  Abdominal: obese, soft, bilateral lower quadrants tender to palpation, Carnett's sign positive, nondistended, no masses or organomegaly, no scars     Pelvic exam:   external genitalia: normal appearance  urinary system: urethral meatus normal  vaginal: normal mucosa without prolapse or lesions  pelvic floor: nontender, pubic symphysis tender to palpation  Normal vaginal cuff, no nodules, no granuloma, masses, cervix and uterus absent    Female chaperone present - see MA note         Assessment:  5-year-old P2  Status post vaginal hysterectomy  Bilateral lower quadrant pain for 1 year  Simple ovarian cysts  7 mm vaginal cuff nodule on ultrasound         Plan:  1. Chronic pelvic pain in female  We discussed that chronic pelvic pain is often multifactorial and treatment of all possible contributing factors is usually necessary for improvement in her pain and quality of life. Based on her history, physical exam, and medical records, we discussed the following issues:    2. Myofascial pain  Based on the patient’s physical exam and history, I have a suspicion that myofascial pain that is contributing to her symptoms. Myofascial pain is pain that arises from dysfunction, spasticity, and/or hypersensitivity of the muscle, fascia or joints in the abdominal wall, pelvic floor, and/or low back. We discussed that the most effective treatment modality is " usually physical therapy, and that it is extremely important that the patient be seen and evaluated by a physical therapist with specialty training in female pelvic pain.  Discussed referral to pelvic floor physical therapy after complete workup of vaginal cuff nodule.    3. Pelvic mass  Vaginal cuff nodule seen on ultrasound.  Not able to visualize on exam or palpate.  Patient does not have dyspareunia or specific vaginal symptoms.  Will do MRI to better characterize this mass.  - MR-PELVIS-WITH & W/O AND SEQUENCES; Future    4. Cyst of ovary, unspecified laterality  Reviewed with patient that her ovarian cysts appear to be physiologic in nature.  She had 2 ultrasounds in the span of 5 months that suggest resolution of larger cysts with appearance of smaller ones.  I reviewed with patient that those are likely physiologic cysts.  I reviewed with her that I have a very low suspicion this is etiology of her pain.  Therefore, I do not recommend any surgical intervention for cysts.    Follow-up with me after completion of pelvic MRI.    Laurence Gibson MD         [1]   Past Medical History:  Diagnosis Date    Ovarian cyst    [2]   Past Surgical History:  Procedure Laterality Date    ABDOMINAL HYSTERECTOMY TOTAL      [id number]     [3]   Social History  Tobacco Use    Smoking status: Never    Smokeless tobacco: Former     Types: Chew   Vaping Use    Vaping status: Never Used   Substance Use Topics    Alcohol use: No    Drug use: No   [4]   Allergies  Allergen Reactions    Marijuana (Cannabis Sativa) Anaphylaxis    Pcn [Penicillins] Hives     Child jay

## 2025-07-22 ENCOUNTER — HOSPITAL ENCOUNTER (OUTPATIENT)
Dept: LAB | Facility: MEDICAL CENTER | Age: 45
End: 2025-07-22
Attending: PHYSICIAN ASSISTANT
Payer: MEDICAID

## 2025-07-22 ENCOUNTER — OFFICE VISIT (OUTPATIENT)
Dept: MEDICAL GROUP | Facility: CLINIC | Age: 45
End: 2025-07-22
Payer: MEDICAID

## 2025-07-22 VITALS
HEART RATE: 60 BPM | DIASTOLIC BLOOD PRESSURE: 62 MMHG | BODY MASS INDEX: 46.06 KG/M2 | SYSTOLIC BLOOD PRESSURE: 108 MMHG | WEIGHT: 286.6 LBS | HEIGHT: 66 IN | TEMPERATURE: 98.2 F | OXYGEN SATURATION: 95 % | RESPIRATION RATE: 18 BRPM

## 2025-07-22 DIAGNOSIS — R55 NEAR SYNCOPE: ICD-10-CM

## 2025-07-22 DIAGNOSIS — E87.1 HYPONATREMIA: ICD-10-CM

## 2025-07-22 DIAGNOSIS — R51.9 POUNDING IN HEAD: ICD-10-CM

## 2025-07-22 DIAGNOSIS — R06.83 SNORING: Primary | ICD-10-CM

## 2025-07-22 DIAGNOSIS — R06.89 DECREASED BREATH SOUNDS: ICD-10-CM

## 2025-07-22 DIAGNOSIS — D72.829 LEUKOCYTOSIS, UNSPECIFIED TYPE: ICD-10-CM

## 2025-07-22 DIAGNOSIS — E55.9 VITAMIN D DEFICIENCY: ICD-10-CM

## 2025-07-22 DIAGNOSIS — E78.5 HYPERLIPIDEMIA, UNSPECIFIED HYPERLIPIDEMIA TYPE: ICD-10-CM

## 2025-07-22 LAB
25(OH)D3 SERPL-MCNC: 50 NG/ML (ref 30–100)
ALBUMIN SERPL BCP-MCNC: 3.9 G/DL (ref 3.2–4.9)
ALBUMIN/GLOB SERPL: 1.1 G/DL
ALP SERPL-CCNC: 94 U/L (ref 30–99)
ALT SERPL-CCNC: 16 U/L (ref 2–50)
ANION GAP SERPL CALC-SCNC: 9 MMOL/L (ref 7–16)
AST SERPL-CCNC: 16 U/L (ref 12–45)
BASOPHILS # BLD AUTO: 0.8 % (ref 0–1.8)
BASOPHILS # BLD: 0.1 K/UL (ref 0–0.12)
BILIRUB SERPL-MCNC: 0.3 MG/DL (ref 0.1–1.5)
BUN SERPL-MCNC: 12 MG/DL (ref 8–22)
CALCIUM ALBUM COR SERPL-MCNC: 10.2 MG/DL (ref 8.5–10.5)
CALCIUM SERPL-MCNC: 10.1 MG/DL (ref 8.5–10.5)
CHLORIDE SERPL-SCNC: 100 MMOL/L (ref 96–112)
CHOLEST SERPL-MCNC: 223 MG/DL (ref 100–199)
CO2 SERPL-SCNC: 27 MMOL/L (ref 20–33)
CREAT SERPL-MCNC: 0.89 MG/DL (ref 0.5–1.4)
EOSINOPHIL # BLD AUTO: 0.21 K/UL (ref 0–0.51)
EOSINOPHIL NFR BLD: 1.7 % (ref 0–6.9)
ERYTHROCYTE [DISTWIDTH] IN BLOOD BY AUTOMATED COUNT: 44.1 FL (ref 35.9–50)
FASTING STATUS PATIENT QL REPORTED: NORMAL
GFR SERPLBLD CREATININE-BSD FMLA CKD-EPI: 81 ML/MIN/1.73 M 2
GLOBULIN SER CALC-MCNC: 3.5 G/DL (ref 1.9–3.5)
GLUCOSE SERPL-MCNC: 78 MG/DL (ref 65–99)
HCT VFR BLD AUTO: 43.2 % (ref 37–47)
HDLC SERPL-MCNC: 40 MG/DL
HGB BLD-MCNC: 14.3 G/DL (ref 12–16)
IMM GRANULOCYTES # BLD AUTO: 0.15 K/UL (ref 0–0.11)
IMM GRANULOCYTES NFR BLD AUTO: 1.2 % (ref 0–0.9)
LDLC SERPL CALC-MCNC: 122 MG/DL
LYMPHOCYTES # BLD AUTO: 3.35 K/UL (ref 1–4.8)
LYMPHOCYTES NFR BLD: 27.1 % (ref 22–41)
MCH RBC QN AUTO: 30.8 PG (ref 27–33)
MCHC RBC AUTO-ENTMCNC: 33.1 G/DL (ref 32.2–35.5)
MCV RBC AUTO: 93.1 FL (ref 81.4–97.8)
MONOCYTES # BLD AUTO: 0.87 K/UL (ref 0–0.85)
MONOCYTES NFR BLD AUTO: 7.1 % (ref 0–13.4)
NEUTROPHILS # BLD AUTO: 7.66 K/UL (ref 1.82–7.42)
NEUTROPHILS NFR BLD: 62.1 % (ref 44–72)
NRBC # BLD AUTO: 0 K/UL
NRBC BLD-RTO: 0 /100 WBC (ref 0–0.2)
PLATELET # BLD AUTO: 362 K/UL (ref 164–446)
PMV BLD AUTO: 10.5 FL (ref 9–12.9)
POTASSIUM SERPL-SCNC: 4.5 MMOL/L (ref 3.6–5.5)
PROT SERPL-MCNC: 7.4 G/DL (ref 6–8.2)
RBC # BLD AUTO: 4.64 M/UL (ref 4.2–5.4)
SODIUM SERPL-SCNC: 136 MMOL/L (ref 135–145)
TRIGL SERPL-MCNC: 305 MG/DL (ref 0–149)
WBC # BLD AUTO: 12.3 K/UL (ref 4.8–10.8)

## 2025-07-22 PROCEDURE — 85025 COMPLETE CBC W/AUTO DIFF WBC: CPT

## 2025-07-22 PROCEDURE — 3074F SYST BP LT 130 MM HG: CPT | Performed by: PHYSICIAN ASSISTANT

## 2025-07-22 PROCEDURE — 36415 COLL VENOUS BLD VENIPUNCTURE: CPT

## 2025-07-22 PROCEDURE — 3078F DIAST BP <80 MM HG: CPT | Performed by: PHYSICIAN ASSISTANT

## 2025-07-22 PROCEDURE — 99214 OFFICE O/P EST MOD 30 MIN: CPT | Performed by: PHYSICIAN ASSISTANT

## 2025-07-22 PROCEDURE — 80053 COMPREHEN METABOLIC PANEL: CPT

## 2025-07-22 PROCEDURE — 80061 LIPID PANEL: CPT

## 2025-07-22 PROCEDURE — 93000 ELECTROCARDIOGRAM COMPLETE: CPT | Performed by: PHYSICIAN ASSISTANT

## 2025-07-22 PROCEDURE — 82306 VITAMIN D 25 HYDROXY: CPT

## 2025-07-22 RX ORDER — ALBUTEROL SULFATE 90 UG/1
2 INHALANT RESPIRATORY (INHALATION) EVERY 4 HOURS PRN
Qty: 1 EACH | Refills: 0 | Status: SHIPPED | OUTPATIENT
Start: 2025-07-22

## 2025-07-22 ASSESSMENT — FIBROSIS 4 INDEX: FIB4 SCORE: 0.51

## 2025-07-22 NOTE — PROGRESS NOTES
"cc:  follow up    Subjective:     Aliza Garrett is a 45 y.o. female presenting for follow up        History of Present Illness  The patient is a 45-year-old female who presents to the office today for follow-up. Testing was not done before coming to be seen.    She reports experiencing a sensation akin to her heart beating in her head, accompanied by a feeling of heat and impending blackout due to plugged ears. These episodes, which began approximately two weeks ago, are not associated with headaches but rather a pounding sensation in the front of her head. She does not experience any chest pain or shortness of breath. The duration of these episodes is typically brief, but they can be triggered by rapid movements such as getting up from her recliner. She has no history of asthma or breathing issues. She has used an inhaler in the past when exposed to smoke, as it makes her feel out of breath at times. She reports no known allergies to contrast, presence of metal in her body, or claustrophobia. She is currently on propranolol.    She also mentions recent difficulties with sleep, including waking herself up due to loud snoring. Her  has also noticed this change. These symptoms have been present for the past 3 to 4 weeks.    Social History:  Marital Status:   Sleep: Reports waking up due to loud snoring,  has noticed this change as well. Symptoms have been present for the past 3 to 4 weeks.    FAMILY HISTORY  - Son has asthma       Review of systems:  See above.   Denies any symptoms unless previously indicated.      Current Medications[1]    Allergies, past medical history, past surgical history, family history, social history reviewed and updated    Objective:     Vitals: /62 (BP Location: Left arm, Patient Position: Sitting, BP Cuff Size: Large adult)   Pulse 60   Temp 36.8 °C (98.2 °F) (Temporal)   Resp 18   Ht 1.676 m (5' 6\")   Wt (!) 130 kg (286 lb 9.6 oz)   SpO2 95%   " BMI 46.26 kg/m²   General: Alert, pleasant, NAD  EYES:   PERRL, EOMI, no icterus or pallor.  Conjunctivae and lids normal.   HENT:  Normocephalic.  External ears normal.   Neck supple.  No carotid thrills or bruits.  Heart: Regular rate and rhythm.  S1 and S2 normal.  No murmurs appreciated.  Distant heart sounds  Respiratory: Normal respiratory effort.  Clear to auscultation bilaterally.  Decreased breath sounds.   Abdomen: obese  Skin: Warm, dry, no rashes.  Musculoskeletal: Gait is normal.  Moves all extremities well.    Extremities: normal range of motion all extremities.   Neurological: No tremors, sensation grossly intact, CN2-12 intact.  Psych:  Affect/mood is normal, judgement is good, memory is intact, grooming is appropriate.    Physical Exam  Neck: No abnormal sounds detected in the carotid arteries.  Respiratory: Decreased lung sounds noted.       Results  Diagnostic Testing   - EKG: Normal sinus bradycardia with no ST elevation or depression.       Assessment/Plan:     Aliza was seen today for headache.    Diagnoses and all orders for this visit:    Snoring  -     Referral to Pulmonary and Sleep Medicine    Pounding in head  -     Referral to Pulmonary and Sleep Medicine  -     MR-BRAIN-WITH & W/O; Future    Near syncope  -     EKG - Clinic Performed    Decreased breath sounds  -     albuterol 108 (90 Base) MCG/ACT Aero Soln inhalation aerosol; Inhale 2 Puffs every four hours as needed for Shortness of Breath.        Assessment & Plan  1. Pounding in head/snoring.  - The etiology of the pounding in the head is currently unknown. Sleep apnea cannot be ruled out as a contributing factor, particularly given the report of increased snoring.  - EKG results indicate normal sinus bradycardia with no ST elevation or depression. Blood pressure is controlled at 108/62.  - An MRI of the brain will be conducted. Referral to sleep medicine for a potential sleep study due to snoring has been submitted. Laboratory  tests will be obtained.  - Referral to pulmonary medicine for a possible sleep study has been submitted.    2. Near syncope.  - Symptoms include feeling like about to pass out, possibly related to sleep apnea.  - EKG results show a slow heart rate of 54, which is borderline low but explainable due to propranolol use.  - Referral for possible studies will be made.  - Patient advised to go to the ER if experiencing increased chest pain, shortness of breath, or feeling like passing out.    3. Decreased breath sounds.  - Decreased breath sounds noted, no abnormal sounds in carotid arteries.  - Possible allergic asthma component suspected.  - Albuterol inhaler prescribed, to be used as needed, 2 puffs every 4-6 hours.    Follow-up: The patient will follow up in 4 weeks with test results sooner if needed.    Return in about 4 weeks (around 8/19/2025), or if symptoms worsen or fail to improve, for follow up testing.    Please note that this dictation was created using voice recognition software. I have made every reasonable attempt to correct obvious errors, but expect that there are errors of grammar and possible content that I did not discover before finalizing note.               [1]   Current Outpatient Medications:     albuterol 108 (90 Base) MCG/ACT Aero Soln inhalation aerosol, Inhale 2 Puffs every four hours as needed for Shortness of Breath., Disp: 1 Each, Rfl: 0    traZODone (DESYREL) 50 MG Tab, , Disp: , Rfl:     propranolol LA (INDERAL LA) 60 MG CAPSULE SR 24 HR, Take 1 Capsule by mouth every day., Disp: 90 Capsule, Rfl: 2    escitalopram (LEXAPRO) 10 MG Tab, , Disp: , Rfl:     hydrOXYzine HCl (ATARAX) 25 MG Tab, Take 25 mg by mouth every day., Disp: , Rfl:     Cholecalciferol (VITAMIN D3) 125 MCG (5000 UT) Cap, Take 1 Capsule by mouth every day., Disp: 90 Capsule, Rfl: 2

## 2025-07-23 ENCOUNTER — RESULTS FOLLOW-UP (OUTPATIENT)
Dept: MEDICAL GROUP | Facility: CLINIC | Age: 45
End: 2025-07-23
Payer: MEDICAID

## 2025-07-23 DIAGNOSIS — R51.9 POUNDING IN HEAD: Primary | ICD-10-CM

## 2025-07-24 RX ORDER — DOXYCYCLINE HYCLATE 100 MG
100 TABLET ORAL 2 TIMES DAILY
Qty: 20 TABLET | Refills: 0 | Status: SHIPPED | OUTPATIENT
Start: 2025-07-24

## 2025-08-03 ENCOUNTER — OFFICE VISIT (OUTPATIENT)
Dept: URGENT CARE | Facility: PHYSICIAN GROUP | Age: 45
End: 2025-08-03
Payer: MEDICAID

## 2025-08-03 VITALS
DIASTOLIC BLOOD PRESSURE: 84 MMHG | HEART RATE: 66 BPM | WEIGHT: 290.8 LBS | HEIGHT: 66 IN | TEMPERATURE: 97 F | RESPIRATION RATE: 16 BRPM | BODY MASS INDEX: 46.73 KG/M2 | SYSTOLIC BLOOD PRESSURE: 122 MMHG | OXYGEN SATURATION: 95 %

## 2025-08-03 DIAGNOSIS — R21 RASH: ICD-10-CM

## 2025-08-03 PROCEDURE — 3079F DIAST BP 80-89 MM HG: CPT

## 2025-08-03 PROCEDURE — 99213 OFFICE O/P EST LOW 20 MIN: CPT

## 2025-08-03 PROCEDURE — 3074F SYST BP LT 130 MM HG: CPT

## 2025-08-03 RX ORDER — EPINEPHRINE 0.15 MG/.3ML
0.15 INJECTION INTRAMUSCULAR ONCE
Qty: 0.3 ML | Refills: 0 | Status: SHIPPED | OUTPATIENT
Start: 2025-08-03 | End: 2025-08-03

## 2025-08-03 RX ORDER — PREDNISONE 20 MG/1
TABLET ORAL
Qty: 15 TABLET | Refills: 0 | Status: SHIPPED | OUTPATIENT
Start: 2025-08-03 | End: 2025-08-13

## 2025-08-03 RX ORDER — DEXAMETHASONE SODIUM PHOSPHATE 10 MG/ML
10 INJECTION, SOLUTION INTRA-ARTICULAR; INTRALESIONAL; INTRAMUSCULAR; INTRAVENOUS; SOFT TISSUE ONCE
Status: COMPLETED | OUTPATIENT
Start: 2025-08-03 | End: 2025-08-03

## 2025-08-03 RX ADMIN — DEXAMETHASONE SODIUM PHOSPHATE 10 MG: 10 INJECTION, SOLUTION INTRA-ARTICULAR; INTRALESIONAL; INTRAMUSCULAR; INTRAVENOUS; SOFT TISSUE at 11:43

## 2025-08-03 ASSESSMENT — ENCOUNTER SYMPTOMS
FEVER: 0
DIARRHEA: 0
SORE THROAT: 0
FATIGUE: 0
VOMITING: 0
COUGH: 1
SHORTNESS OF BREATH: 0

## 2025-08-03 ASSESSMENT — FIBROSIS 4 INDEX: FIB4 SCORE: 0.51

## 2025-08-05 ENCOUNTER — OFFICE VISIT (OUTPATIENT)
Dept: URGENT CARE | Facility: PHYSICIAN GROUP | Age: 45
End: 2025-08-05
Payer: MEDICAID

## 2025-08-05 VITALS
HEART RATE: 57 BPM | OXYGEN SATURATION: 97 % | DIASTOLIC BLOOD PRESSURE: 84 MMHG | RESPIRATION RATE: 16 BRPM | WEIGHT: 293 LBS | SYSTOLIC BLOOD PRESSURE: 130 MMHG | TEMPERATURE: 98.7 F | BODY MASS INDEX: 47.09 KG/M2 | HEIGHT: 66 IN

## 2025-08-05 DIAGNOSIS — L50.9 HIVES: ICD-10-CM

## 2025-08-05 DIAGNOSIS — R21 RASH: Primary | ICD-10-CM

## 2025-08-05 PROCEDURE — 99214 OFFICE O/P EST MOD 30 MIN: CPT

## 2025-08-05 PROCEDURE — 3075F SYST BP GE 130 - 139MM HG: CPT

## 2025-08-05 PROCEDURE — 3079F DIAST BP 80-89 MM HG: CPT

## 2025-08-05 RX ORDER — TRIAMCINOLONE ACETONIDE 0.25 MG/G
1 CREAM TOPICAL 2 TIMES DAILY
Qty: 454 G | Refills: 0 | Status: SHIPPED | OUTPATIENT
Start: 2025-08-05 | End: 2025-08-19

## 2025-08-05 ASSESSMENT — ENCOUNTER SYMPTOMS
ABDOMINAL PAIN: 0
VOMITING: 0
WEAKNESS: 0
CHILLS: 0
DIZZINESS: 0
NAUSEA: 0
SHORTNESS OF BREATH: 0
SPUTUM PRODUCTION: 0
SENSORY CHANGE: 0
WEAKNESS: 0
DIAPHORESIS: 0
DIZZINESS: 0
DIARRHEA: 0
HEADACHES: 0
NAUSEA: 0
HEADACHES: 0
PALPITATIONS: 0
ABDOMINAL PAIN: 0
CHILLS: 0
FEVER: 0
MYALGIAS: 0
WHEEZING: 0

## 2025-08-05 ASSESSMENT — FIBROSIS 4 INDEX: FIB4 SCORE: 0.51

## 2025-08-07 ENCOUNTER — OFFICE VISIT (OUTPATIENT)
Dept: SLEEP MEDICINE | Facility: MEDICAL CENTER | Age: 45
End: 2025-08-07
Attending: PHYSICIAN ASSISTANT
Payer: MEDICAID

## 2025-08-07 VITALS
OXYGEN SATURATION: 97 % | HEART RATE: 51 BPM | BODY MASS INDEX: 47.09 KG/M2 | WEIGHT: 293 LBS | DIASTOLIC BLOOD PRESSURE: 80 MMHG | SYSTOLIC BLOOD PRESSURE: 134 MMHG | HEIGHT: 66 IN | RESPIRATION RATE: 18 BRPM

## 2025-08-07 DIAGNOSIS — G47.33 OSA (OBSTRUCTIVE SLEEP APNEA): Primary | ICD-10-CM

## 2025-08-07 PROCEDURE — 99213 OFFICE O/P EST LOW 20 MIN: CPT | Performed by: PHYSICIAN ASSISTANT

## 2025-08-07 PROCEDURE — 3079F DIAST BP 80-89 MM HG: CPT | Performed by: PHYSICIAN ASSISTANT

## 2025-08-07 PROCEDURE — 3075F SYST BP GE 130 - 139MM HG: CPT | Performed by: PHYSICIAN ASSISTANT

## 2025-08-07 ASSESSMENT — ENCOUNTER SYMPTOMS
SINUS PAIN: 0
HEARTBURN: 1
CHILLS: 0
PALPITATIONS: 0
HEADACHES: 1
ROS GI COMMENTS: UPPER AND LOWER DENTURES, NO SWALLOWING ISSUES
COUGH: 0
DIZZINESS: 1
INSOMNIA: 0
WHEEZING: 0
TREMORS: 0
SORE THROAT: 0
WEIGHT LOSS: 0
FEVER: 0
ORTHOPNEA: 0
SHORTNESS OF BREATH: 0
SPUTUM PRODUCTION: 0

## 2025-08-07 ASSESSMENT — FIBROSIS 4 INDEX: FIB4 SCORE: 0.51

## 2025-08-20 ENCOUNTER — HOSPITAL ENCOUNTER (OUTPATIENT)
Dept: RADIOLOGY | Facility: MEDICAL CENTER | Age: 45
End: 2025-08-20
Attending: PHYSICIAN ASSISTANT
Payer: MEDICAID

## 2025-08-20 ENCOUNTER — HOSPITAL ENCOUNTER (OUTPATIENT)
Dept: RADIOLOGY | Facility: MEDICAL CENTER | Age: 45
End: 2025-08-20
Attending: STUDENT IN AN ORGANIZED HEALTH CARE EDUCATION/TRAINING PROGRAM
Payer: MEDICAID

## 2025-08-20 DIAGNOSIS — R19.00 PELVIC MASS: ICD-10-CM

## 2025-08-20 DIAGNOSIS — R10.2 PELVIC PAIN: ICD-10-CM

## 2025-08-20 PROCEDURE — 700117 HCHG RX CONTRAST REV CODE 255: Mod: UD | Performed by: PHYSICIAN ASSISTANT

## 2025-08-20 PROCEDURE — 700117 HCHG RX CONTRAST REV CODE 255: Mod: JZ,UD | Performed by: STUDENT IN AN ORGANIZED HEALTH CARE EDUCATION/TRAINING PROGRAM

## 2025-08-20 PROCEDURE — A9579 GAD-BASE MR CONTRAST NOS,1ML: HCPCS | Mod: JZ,UD | Performed by: STUDENT IN AN ORGANIZED HEALTH CARE EDUCATION/TRAINING PROGRAM

## 2025-08-20 PROCEDURE — 72197 MRI PELVIS W/O & W/DYE: CPT

## 2025-08-20 PROCEDURE — 74177 CT ABD & PELVIS W/CONTRAST: CPT

## 2025-08-20 RX ORDER — GADOTERIDOL 279.3 MG/ML
20 INJECTION INTRAVENOUS ONCE
Status: COMPLETED | OUTPATIENT
Start: 2025-08-20 | End: 2025-08-20

## 2025-08-20 RX ADMIN — IOHEXOL 100 ML: 350 INJECTION, SOLUTION INTRAVENOUS at 14:34

## 2025-08-20 RX ADMIN — IOHEXOL 25 ML: 240 INJECTION, SOLUTION INTRATHECAL; INTRAVASCULAR; INTRAVENOUS; ORAL at 14:35

## 2025-08-20 RX ADMIN — GADOTERIDOL 20 ML: 279.3 INJECTION, SOLUTION INTRAVENOUS at 15:05

## 2025-08-21 ENCOUNTER — APPOINTMENT (OUTPATIENT)
Dept: MEDICAL GROUP | Facility: CLINIC | Age: 45
End: 2025-08-21
Payer: MEDICAID

## 2025-08-21 ENCOUNTER — OFFICE VISIT (OUTPATIENT)
Dept: MEDICAL GROUP | Facility: CLINIC | Age: 45
End: 2025-08-21
Payer: MEDICAID

## 2025-08-21 ENCOUNTER — RESULTS FOLLOW-UP (OUTPATIENT)
Dept: GYNECOLOGY | Facility: CLINIC | Age: 45
End: 2025-08-21

## 2025-08-21 VITALS
WEIGHT: 293 LBS | HEART RATE: 58 BPM | RESPIRATION RATE: 20 BRPM | TEMPERATURE: 98.2 F | SYSTOLIC BLOOD PRESSURE: 126 MMHG | BODY MASS INDEX: 47.09 KG/M2 | OXYGEN SATURATION: 93 % | HEIGHT: 66 IN | DIASTOLIC BLOOD PRESSURE: 82 MMHG

## 2025-08-21 DIAGNOSIS — Z88.1 ALLERGY TO ANTIBIOTIC: ICD-10-CM

## 2025-08-21 DIAGNOSIS — N80.129 ENDOMETRIOMA: ICD-10-CM

## 2025-08-21 DIAGNOSIS — E78.5 HYPERLIPIDEMIA, UNSPECIFIED HYPERLIPIDEMIA TYPE: ICD-10-CM

## 2025-08-21 DIAGNOSIS — D72.829 LEUKOCYTOSIS, UNSPECIFIED TYPE: ICD-10-CM

## 2025-08-21 DIAGNOSIS — N83.201 CYST OF RIGHT OVARY: Primary | ICD-10-CM

## 2025-08-21 PROCEDURE — 3074F SYST BP LT 130 MM HG: CPT | Performed by: PHYSICIAN ASSISTANT

## 2025-08-21 PROCEDURE — 99214 OFFICE O/P EST MOD 30 MIN: CPT | Performed by: PHYSICIAN ASSISTANT

## 2025-08-21 PROCEDURE — 3079F DIAST BP 80-89 MM HG: CPT | Performed by: PHYSICIAN ASSISTANT

## 2025-08-21 RX ORDER — TRIAMCINOLONE ACETONIDE 1 MG/G
CREAM TOPICAL
Qty: 60 G | Refills: 0 | Status: SHIPPED | OUTPATIENT
Start: 2025-08-21

## 2025-08-21 ASSESSMENT — FIBROSIS 4 INDEX: FIB4 SCORE: 0.51

## 2025-08-25 ENCOUNTER — TELEPHONE (OUTPATIENT)
Dept: OBGYN | Facility: CLINIC | Age: 45
End: 2025-08-25
Payer: MEDICAID

## 2025-08-27 ENCOUNTER — HOME STUDY (OUTPATIENT)
Dept: SLEEP MEDICINE | Facility: MEDICAL CENTER | Age: 45
End: 2025-08-27
Attending: PHYSICIAN ASSISTANT
Payer: MEDICAID

## 2025-08-28 ENCOUNTER — TELEPHONE (OUTPATIENT)
Dept: GYNECOLOGY | Facility: CLINIC | Age: 45
End: 2025-08-28
Payer: MEDICAID